# Patient Record
Sex: MALE | Race: WHITE | Employment: OTHER | URBAN - METROPOLITAN AREA
[De-identification: names, ages, dates, MRNs, and addresses within clinical notes are randomized per-mention and may not be internally consistent; named-entity substitution may affect disease eponyms.]

---

## 2017-02-01 ENCOUNTER — HOSPITAL ENCOUNTER (OUTPATIENT)
Dept: LAB | Age: 77
Discharge: HOME | End: 2017-02-01
Payer: COMMERCIAL

## 2017-02-01 ENCOUNTER — OFFICE VISIT (OUTPATIENT)
Dept: UROLOGY | Age: 77
End: 2017-02-01

## 2017-02-01 VITALS
HEART RATE: 67 BPM | WEIGHT: 220 LBS | TEMPERATURE: 98.3 F | OXYGEN SATURATION: 97 % | BODY MASS INDEX: 28.23 KG/M2 | DIASTOLIC BLOOD PRESSURE: 64 MMHG | SYSTOLIC BLOOD PRESSURE: 110 MMHG | HEIGHT: 74 IN

## 2017-02-01 DIAGNOSIS — C61 MALIGNANT NEOPLASM OF PROSTATE (HCC): Primary | ICD-10-CM

## 2017-02-01 DIAGNOSIS — C61 MALIGNANT NEOPLASM OF PROSTATE (HCC): ICD-10-CM

## 2017-02-01 LAB
BILIRUB UR QL STRIP: NEGATIVE
GLUCOSE UR-MCNC: NEGATIVE MG/DL
KETONES P FAST UR STRIP-MCNC: NEGATIVE MG/DL
PH UR STRIP: 6.5 [PH] (ref 4.6–8)
PROT UR QL STRIP: NORMAL MG/DL
PSA SERPL-MCNC: 2.1 NG/ML (ref 0–4)
SP GR UR STRIP: 1.03 (ref 1–1.03)
UA UROBILINOGEN AMB POC: NORMAL (ref 0.2–1)
URINALYSIS CLARITY POC: CLEAR
URINALYSIS COLOR POC: YELLOW
URINE BLOOD POC: NEGATIVE
URINE LEUKOCYTES POC: NEGATIVE
URINE NITRITES POC: NEGATIVE

## 2017-02-01 PROCEDURE — 84153 ASSAY OF PSA TOTAL: CPT | Performed by: UROLOGY

## 2017-02-01 NOTE — MR AVS SNAPSHOT
Visit Information Date & Time Provider Department Dept. Phone Encounter #  
 2/1/2017  9:00 AM Nolberto Short, [address] [id number] Your Appointments 3/6/2017  9:40 AM  
Follow Up with Josephine Musa MD  
[facility] ([facility]) Appt Note: 6 month follow up with EKG  
 [address] [place] [address] [address] [phone] [address] Upcoming Health Maintenance Date Due DTaP/Tdap/Td series (1 - Tdap) 7/27/1961 ZOSTER VACCINE AGE 60> 7/27/2000 GLAUCOMA SCREENING Q2Y 7/27/2005 Pneumococcal 65+ High/Highest Risk (1 of 2 - PCV13) 7/27/2005 MEDICARE YEARLY EXAM 7/27/2005 INFLUENZA AGE 9 TO ADULT 8/1/2016 Allergies as of 2/1/2017  Review Complete On: 2/1/2017 By: Jj Haley No Known Allergies Current Immunizations  Never Reviewed No immunizations on file. Not reviewed this visit You Were Diagnosed With   
  
 Codes Comments Malignant neoplasm of prostate ([facility] [address]    -  Primary ICD-10-CM: T28 ICD-9-CM: 701 Vitals BP Pulse Temp Height(growth percentile) Weight(growth percentile) SpO2  
 110/64 (BP 1 Location: Left arm, BP Patient Position: Sitting) 67 98.3 °F (36.8 °C) (Oral) 6' 2\" (1.88 m) 220 lb (99.8 kg) 97% BMI Smoking Status 28.25 kg/m2 Former Smoker Vitals History BMI and BSA Data Body Mass Index Body Surface Area  
 28.25 kg/m 2 2.28 m 2 Preferred Pharmacy Pharmacy Name Phone [phone] - [address], [address] AT [address] [phone] Your Updated Medication List  
  
   
This list is accurate as of: 2/1/17  9:23 AM.  Always use your most recent med list.  
  
  
  
  
 aspirin delayed-release 81 mg tablet Take  by mouth daily. COMBIGAN OP Instill  in eye. FISH OIL 1,000 mg Cap Generic drug:  omega-3 fatty acids-vitamin e Take 1 Cap by mouth. LUMIGAN OP Instill  in eye.  
  
 metoprolol succinate 25 mg XL tablet Commonly known as:  TOPROL-XL TK 1 T PO QD  
  
 multivitamin tablet Commonly known as:  ONE A DAY Take 1 Tab by mouth daily. SIMBRINZA 1-0.2 % Drps Generic drug:  brinzolamide-brimonidine Apply  to eye.  
  
 simvastatin 80 mg tablet Commonly known as:  ZOCOR Take 1 Tab by mouth nightly. therapeutic multivitamin tablet Commonly known as:  [facility] Take 1 Tab by Mouth Once a Day. We Performed the Following AMB POC URINALYSIS DIP STICK AUTO W/O MICRO [78030 CPT(R)] Patient Instructions Prostate Cancer: Care Instructions Your Care Instructions The prostate gland is a small, walnut-shaped organ that lies just below a man's bladder. It surrounds the urethra, the tube that carries urine from the bladder out of the body through the penis. Prostate cancer is the abnormal growth of cells in the prostate gland. Prostate cancer cells can spread within the prostate, to nearby lymph nodes and other tissues, and to other parts of the body. When the cancer hasn't spread outside the prostate, it is called localized prostate cancer. With localized prostate cancer, your options depend on how likely it is that your cancer will grow. Tests will show if your cancer is likely to grow. · Low-risk cancer isn't likely to grow right away. If your cancer is low-risk, you can choose active surveillance. This means your cancer will be watched closely by your doctor with regular checkups and tests to see if the cancer grows. This choice allows you to delay having surgery or radiation, often for many years. If the cancer grows very slowly, you may never need treatment. · Medium-risk cancer is more likely to grow.  Some men with this type of cancer may be able to choose active surveillance. Others may need to choose surgery or radiation. · High-risk cancer is most likely to grow. If you have high-risk cancer, you will likely need to choose surgery or radiation. If your cancer has already spread outside the prostate or to other parts of the body, then you may have other treatments, like chemotherapy or hormone therapy. If you are over age [de-identified] or have other serious health problems, like heart disease, you may choose not to have treatments to cure your cancer. Instead, you can just have treatments to manage your symptoms. This is called watchful waiting. Finding out that you have cancer is scary. You may feel many emotions and may need some help coping. Seek out family, friends, and counselors for support. You also can do things at home to make yourself feel better while you go through treatment. Call the [facility] (3-764.610.5156) or visit its website at [address] for more information. Follow-up care is a key part of your treatment and safety. Be sure to make and go to all appointments, and call your doctor if you are having problems. It's also a good idea to know your test results and keep a list of the medicines you take. How can you care for yourself at home? · Your doctor will talk to you about your treatment options. You may need to learn more about each of them before you can decide which treatment is best for you. · Take your medicines exactly as prescribed. Call your doctor if you think you are having a problem with your medicine. You will get more details on the specific medicines your doctor prescribes. · Eat healthy food. If you do not feel like eating, try to eat food that has protein and extra calories to keep up your strength and prevent weight loss. Drink liquid meal replacements for extra calories and protein. Try to eat your main meal early. · Take steps to control your stress and workload.  Learn relaxation techniques. ¨ Share your feelings. Stress and tension affect our emotions. By expressing your feelings to others, you may be able to understand and cope with them. ¨ Consider joining a support group. Talking about a problem with your spouse, a good friend, or other people with similar problems is a good way to reduce tension and stress. ¨ Express yourself through art. Try writing, crafts, dance, or art to relieve stress. Some dance, writing, or art groups may be available just for people who have cancer. ¨ Be kind to your body and mind. Getting enough sleep, eating a healthy diet, and taking time to do things you enjoy can contribute to an overall feeling of balance in your life and can help reduce stress. ¨ Get help if you need it. Discuss your concerns with your doctor or counselor. · Get some physical activity every day, but do not get too tired. Keep doing the hobbies you enjoy as your energy allows. · If you are vomiting or have diarrhea: ¨ Drink plenty of fluids (enough so that your urine is light yellow or clear like water) to prevent dehydration. Choose water and other caffeine-free clear liquids. If you have kidney, heart, or liver disease and have to limit fluids, talk with your doctor before you increase the amount of fluids you drink. ¨ When you are able to eat, try clear soups, mild foods, and liquids until all symptoms are gone for 12 to 48 hours. Jell-O, dry toast, crackers, and cooked cereal are also good choices. · If you have not already done so, prepare a list of advance directives. Advance directives are instructions to your doctor and family members about what kind of care you want if you become unable to speak or express yourself. When should you call for help? Call your doctor now or seek immediate medical care if: 
· You cannot urinate. · You have symptoms of a urinary infection. For example: ¨ You have blood or pus in your urine. ¨ You have pain in your back just below your rib cage. This is called flank pain. ¨ You have a fever, chills, or body aches. ¨ It hurts to urinate. ¨ You have groin or belly pain. · You have pain in your back or hips. · Your pain is not controlled. · You are vomiting or nauseated. Watch closely for changes in your health, and be sure to contact your doctor if: 
· You have pain when you ejaculate. · You have trouble starting or controlling your urine. Where can you learn more? Go to http://haley-travis.info/. Enter V141 in the search box to learn more about \"Prostate Cancer: Care Instructions. \" Current as of: July 26, 2016 Content Version: 11.1 © 1334-7228 [employer]. Care instructions adapted under license by [employer] (which disclaims liability or warranty for this information). If you have questions about a medical condition or this instruction, always ask your healthcare professional. [place] [address] any warranty or liability for your use of this information. Introducing [facility]! Dear Sheri Zhao: Thank you for requesting a Dragonfly account. Our records indicate that you already have an active Dragonfly account. You can access your account anytime at [email or web address] Did you know that you can access your hospital and ER discharge instructions at any time in Dragonfly? You can also review all of your test results from your hospital stay or ER visit. Additional Information If you have questions, please visit the Frequently Asked Questions section of the Dragonfly website at [email or web address] Remember, Dragonfly is NOT to be used for urgent needs. For medical emergencies, dial 911. Now available from your iPhone and Android! Please provide this summary of care documentation to your next provider. Your primary care clinician is listed as Asif Perez. If you have any questions after today's visit, please call [phone].

## 2017-02-01 NOTE — PROGRESS NOTES
Lilian Villavicencio 68 y.o. male     Mr. Jose G Wilkinson seen today for prostate carcinoma follow-up                             PSA 3.4/Itzel 6 histology/XRT 2005    Patient has had a low-level PSA elevation since 2010 in the 2-3 range with no irritative or obstructive voiding symptoms  Patient is currently asymptomatic        PSA 1.8 in February 2016  PSA 2.9 2010                                                        negative bone scan in 2011  PSA 2.2 in June 2012  PSA 2.8 2013  PSA 6.8 2014  PSA 3.8 in August 2014  PSA 2.3 in July 2015  PSA 2.5 in August 2016     Review of Systems:    CNS: Coronary artery disease  Respiratory: No wheezing or shortness of breath  Cardiovascular:Coronary artery disease/hypertension  Intestinal:No GERD or constipation  Urinary: Kidney stone disease  Skeletal: DJD  Endocrine:No diabetes or thyroid disease  Other:    Allergies: No Known Allergies   Medications:    Current Outpatient Prescriptions   Medication Sig Dispense Refill    brinzolamide-brimonidine (SIMBRINZA) 1-0.2 % drps Apply  to eye.  simvastatin (ZOCOR) 80 mg tablet Take 1 Tab by mouth nightly. 90 Tab 3    metoprolol succinate (TOPROL-XL) 25 mg XL tablet TK 1 T PO QD  3    BIMATOPROST (LUMIGAN OP) Instill  in eye.  therapeutic multivitamin (THERAGRAN) tablet Take 1 Tab by Mouth Once a Day.  multivitamin (ONE A DAY) tablet Take 1 Tab by mouth daily.  aspirin delayed-release 81 mg tablet Take  by mouth daily.  omega-3 fatty acids-vitamin e (FISH OIL) 1,000 mg Cap Take 1 Cap by mouth.  BRIMONIDINE TARTRATE/TIMOLOL (COMBIGAN OP) Instill  in eye.          Past Medical History   Diagnosis Date    Atherosclerotic coronary vascular disease     Calculus of kidney     Calculus of ureter     Family history of prostate cancer     Glaucoma     History of radiation therapy      external beam radiation for prostate cancer    Hypercholesterolemia     Hypertension     Malignant [facility] prostate (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 and other lower urinary tract symptoms (LUTS)       Past Surgical History   Procedure Laterality Date    Hx hernia repair       right inguinal herniorrhapy    Pr cardiac surg procedure unlist       coronary artery stent placement    Pr prostate biopsy, needle, saturation sampling       Family History   Problem Relation Age of Onset    Cancer Father      prostate    Cancer Other     Heart Disease Other     Diabetes Other         Physical Examination: Well-nourished mature male in no apparent distress    Prostate by MICHAEL is small and barely palpable-smooth nontender no nodularity no induration  No rectal masses induration or tenderness    Urinalysis: Negative dipstick/nitrite negative    Impression: Prostate carcinoma status post XRT 2005                       -Low-level PSA elevation ×8 years with negative bone scan and absence of symptoms-suspect PSA elevation is secondary to seminal vesicle origin, not recrudescence of prostate                        carcinoma      Plan: PSA today    rtc 1 yr      More than 1/2 of this 15 minute visit was spent in counselling and coordination of care, as described above. Ean Alvarado MD  -electronically signed-    PLEASE NOTE:  This document has been produced using voice recognition software. Unrecognized errors in transcription may be present.

## 2017-02-01 NOTE — PROGRESS NOTES
Mr. Amy Leyva has a reminder for a \"due or due soon\" health maintenance. I have asked that he contact his primary care provider for follow-up on this health maintenance.

## 2017-02-01 NOTE — PATIENT INSTRUCTIONS
Prostate Cancer: Care Instructions  Your Care Instructions    The prostate gland is a small, walnut-shaped organ that lies just below a man's bladder. It surrounds the urethra, the tube that carries urine from the bladder out of the body through the penis. Prostate cancer is the abnormal growth of cells in the prostate gland. Prostate cancer cells can spread within the prostate, to nearby lymph nodes and other tissues, and to other parts of the body. When the cancer hasn't spread outside the prostate, it is called localized prostate cancer. With localized prostate cancer, your options depend on how likely it is that your cancer will grow. Tests will show if your cancer is likely to grow. · Low-risk cancer isn't likely to grow right away. If your cancer is low-risk, you can choose active surveillance. This means your cancer will be watched closely by your doctor with regular checkups and tests to see if the cancer grows. This choice allows you to delay having surgery or radiation, often for many years. If the cancer grows very slowly, you may never need treatment. · Medium-risk cancer is more likely to grow. Some men with this type of cancer may be able to choose active surveillance. Others may need to choose surgery or radiation. · High-risk cancer is most likely to grow. If you have high-risk cancer, you will likely need to choose surgery or radiation. If your cancer has already spread outside the prostate or to other parts of the body, then you may have other treatments, like chemotherapy or hormone therapy. If you are over age [de-identified] or have other serious health problems, like heart disease, you may choose not to have treatments to cure your cancer. Instead, you can just have treatments to manage your symptoms. This is called watchful waiting. Finding out that you have cancer is scary. You may feel many emotions and may need some help coping. Seek out family, friends, and counselors for support.  You also can do things at home to make yourself feel better while you go through treatment. Call the [facility] (1-111.106.7461) or visit its website at [address] for more information. Follow-up care is a key part of your treatment and safety. Be sure to make and go to all appointments, and call your doctor if you are having problems. It's also a good idea to know your test results and keep a list of the medicines you take. How can you care for yourself at home? · Your doctor will talk to you about your treatment options. You may need to learn more about each of them before you can decide which treatment is best for you. · Take your medicines exactly as prescribed. Call your doctor if you think you are having a problem with your medicine. You will get more details on the specific medicines your doctor prescribes. · Eat healthy food. If you do not feel like eating, try to eat food that has protein and extra calories to keep up your strength and prevent weight loss. Drink liquid meal replacements for extra calories and protein. Try to eat your main meal early. · Take steps to control your stress and workload. Learn relaxation techniques. ¨ Share your feelings. Stress and tension affect our emotions. By expressing your feelings to others, you may be able to understand and cope with them. ¨ Consider joining a support group. Talking about a problem with your spouse, a good friend, or other people with similar problems is a good way to reduce tension and stress. ¨ Express yourself through art. Try writing, crafts, dance, or art to relieve stress. Some dance, writing, or art groups may be available just for people who have cancer. ¨ Be kind to your body and mind. Getting enough sleep, eating a healthy diet, and taking time to do things you enjoy can contribute to an overall feeling of balance in your life and can help reduce stress. ¨ Get help if you need it.  Discuss your concerns with your doctor or counselor. · Get some physical activity every day, but do not get too tired. Keep doing the hobbies you enjoy as your energy allows. · If you are vomiting or have diarrhea:  ¨ Drink plenty of fluids (enough so that your urine is light yellow or clear like water) to prevent dehydration. Choose water and other caffeine-free clear liquids. If you have kidney, heart, or liver disease and have to limit fluids, talk with your doctor before you increase the amount of fluids you drink. ¨ When you are able to eat, try clear soups, mild foods, and liquids until all symptoms are gone for 12 to 48 hours. Jell-O, dry toast, crackers, and cooked cereal are also good choices. · If you have not already done so, prepare a list of advance directives. Advance directives are instructions to your doctor and family members about what kind of care you want if you become unable to speak or express yourself. When should you call for help? Call your doctor now or seek immediate medical care if:  · You cannot urinate. · You have symptoms of a urinary infection. For example:  ¨ You have blood or pus in your urine. ¨ You have pain in your back just below your rib cage. This is called flank pain. ¨ You have a fever, chills, or body aches. ¨ It hurts to urinate. ¨ You have groin or belly pain. · You have pain in your back or hips. · Your pain is not controlled. · You are vomiting or nauseated. Watch closely for changes in your health, and be sure to contact your doctor if:  · You have pain when you ejaculate. · You have trouble starting or controlling your urine. Where can you learn more? Go to http://haley-travis.info/. Enter V141 in the search box to learn more about \"Prostate Cancer: Care Instructions. \"  Current as of: July 26, 2016  Content Version: 11.1  © 0425-1915 [employer].  Care instructions adapted under license by [employer] (which disclaims liability or warranty for this information). If you have questions about a medical condition or this instruction, always ask your healthcare professional. [place] [address] any warranty or liability for your use of this information.

## 2017-03-06 ENCOUNTER — OFFICE VISIT (OUTPATIENT)
Dept: CARDIOLOGY CLINIC | Age: 77
End: 2017-03-06

## 2017-03-06 VITALS
DIASTOLIC BLOOD PRESSURE: 80 MMHG | WEIGHT: 219 LBS | HEART RATE: 64 BPM | SYSTOLIC BLOOD PRESSURE: 122 MMHG | BODY MASS INDEX: 28.11 KG/M2 | OXYGEN SATURATION: 98 % | HEIGHT: 74 IN

## 2017-03-06 DIAGNOSIS — E78.00 HYPERCHOLESTEROLEMIA: ICD-10-CM

## 2017-03-06 DIAGNOSIS — I25.10 CORONARY ARTERY DISEASE INVOLVING NATIVE CORONARY ARTERY OF NATIVE HEART WITHOUT ANGINA PECTORIS: Primary | ICD-10-CM

## 2017-03-06 NOTE — PROGRESS NOTES
HISTORY OF PRESENT ILLNESS  Lennox Payton is a 68 y.o. male. HPI  He has been doing very well. He denies any cardiac symptoms. He has had no chest pain, dyspnea, orthopnea or PND. He denies palpitations, dizziness or syncope. He has had no symptoms to indicate claudication, TIA or amaurosis fugax. He continues to exercise regularly at the gym with no difficulties. His cholesterol profile has been very satisfactory. He is a nonsmoker. He has history of coronary artery disease and had stent placement in January of 2004 in [place] at [facility]. 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 He has history of hypertension and hypercholesterolemia. He has a family history of prostate cancer. He denies diabetes mellitus. Review of Systems   Constitutional: Negative for malaise/fatigue and weight loss. HENT: Negative for hearing loss. Eyes: Negative for blurred vision and double vision. Respiratory: Negative for shortness of breath. Cardiovascular: Negative for chest pain, palpitations, orthopnea, claudication, leg swelling and PND. Gastrointestinal: Negative for blood in stool, heartburn and melena. Genitourinary: Positive for frequency. Negative for dysuria, hematuria and urgency. Musculoskeletal: Negative for back pain and joint pain. Skin: Negative for itching and rash. Neurological: Negative for dizziness, loss of consciousness, weakness and headaches. Psychiatric/Behavioral: Negative for depression and memory loss. Physical Exam   Constitutional: He is oriented to person, place, and time. He appears well-developed and well-nourished. HENT:   Head: Normocephalic and atraumatic. Eyes: Conjunctivae are normal. Pupils are equal, round, and reactive to light. Neck: Normal range of motion. Neck supple. No JVD present.    Cardiovascular: Normal rate, regular rhythm, S1 normal and S2 normal.   No extrasystoles are present. PMI is not displaced. Exam reveals no gallop and no friction rub. No murmur heard. Pulses:       Carotid pulses are 3+ on the right side, and 3+ on the left side. Pulmonary/Chest: Effort normal. He has no rales. Abdominal: Soft. There is no tenderness. Musculoskeletal: He exhibits no edema. Neurological: He is alert and oriented to person, place, and time. No cranial nerve deficit. Skin: Skin is warm and dry. Psychiatric: He has a normal mood and affect. His behavior is normal.     Visit Vitals    /80    Pulse 64    Ht 6' 2\" (1.88 m)    Wt 99.3 kg (219 lb)    SpO2 98%    BMI 28.12 kg/m2       Past Medical History:   Diagnosis Date    Atherosclerotic coronary vascular disease     Calculus of kidney     Calculus of ureter     Family history of prostate cancer     Glaucoma     History of radiation therapy     external beam radiation for prostate cancer    Hypercholesterolemia     Hypertension     [facility]    [facility]     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      Social History Main Topics    Smoking status: Former Smoker     Years: 2.00     Quit date: 6/16/1961    Smokeless tobacco: Never Used    Alcohol use Yes      Comment: socially    Drug use: Not on file    Sexual activity: Not on file     Other Topics Concern    Not on file     Social History Narrative       Family History   Problem Relation Age of Onset    Cancer Father      prostate    Cancer Other     Heart Disease Other     Diabetes Other        Past Surgical History:   Procedure Laterality Date    CARDIAC SURG PROCEDURE UNLIST coronary artery stent placement    HX HERNIA REPAIR      right inguinal herniorrhapy    [place] PROSTATE BIOPSY, NEEDLE, SATURATION SAMPLING         Current Outpatient Prescriptions   Medication Sig Dispense Refill    brinzolamide-brimonidine (SIMBRINZA) 1-0.2 % drps Apply  to eye.  simvastatin (ZOCOR) 80 mg tablet Take 1 Tab by mouth nightly. 90 Tab 3    metoprolol succinate (TOPROL-XL) 25 mg XL tablet TK 1 T PO QD  3    BIMATOPROST (LUMIGAN OP) Instill  in eye.  therapeutic multivitamin (THERAGRAN) tablet Take 1 Tab by Mouth Once a Day.  multivitamin (ONE A DAY) tablet Take 1 Tab by mouth daily.  aspirin delayed-release 81 mg tablet Take  by mouth daily.  omega-3 fatty acids-vitamin e (FISH OIL) 1,000 mg Cap Take 1 Cap by mouth. EKG: unchanged from previous tracings, normal sinus rhythm, nonspecific ST and T waves changes, inferolateral scar  . ASSESSMENT and PLAN  Encounter Diagnoses   Name Primary?  History of coronary artery disease, s/p stent Jan. 2004,inferior scar Yes    Hypercholesterolemia    He continues to do well. He has been very active and has had no symptoms to indicate angina. His cholesterol profile is very satisfactory. We discussed a followup stress test, but the patient declined to have a stress test unless he becomes symptomatic, which I concurred with. For now, he will be continued on the current medical regimen.

## 2017-03-06 NOTE — PROGRESS NOTES
1. Have you been to the ER, urgent care clinic since your last visit? Hospitalized since your last visit? no  2. Have you seen or consulted any other health care providers outside of the [address] [place] [address] since your last visit? Include any pap smears or colon screening.  no

## 2017-07-07 RX ORDER — METOPROLOL SUCCINATE 25 MG/1
TABLET, EXTENDED RELEASE ORAL
Qty: 90 TAB | Refills: 3 | Status: SHIPPED | OUTPATIENT
Start: 2017-07-07 | End: 2018-06-27 | Stop reason: SDUPTHER

## 2017-09-13 ENCOUNTER — OFFICE VISIT (OUTPATIENT)
Dept: CARDIOLOGY CLINIC | Age: 77
End: 2017-09-13

## 2017-09-13 ENCOUNTER — TELEPHONE (OUTPATIENT)
Dept: CARDIOLOGY CLINIC | Age: 77
End: 2017-09-13

## 2017-09-13 VITALS
HEART RATE: 96 BPM | SYSTOLIC BLOOD PRESSURE: 110 MMHG | WEIGHT: 217 LBS | HEIGHT: 74 IN | DIASTOLIC BLOOD PRESSURE: 62 MMHG | OXYGEN SATURATION: 64 % | BODY MASS INDEX: 27.85 KG/M2

## 2017-09-13 DIAGNOSIS — E78.00 HYPERCHOLESTEREMIA: ICD-10-CM

## 2017-09-13 DIAGNOSIS — I25.10 CORONARY ARTERY DISEASE INVOLVING NATIVE CORONARY ARTERY OF NATIVE HEART WITHOUT ANGINA PECTORIS: Primary | ICD-10-CM

## 2017-09-13 NOTE — PROGRESS NOTES
HISTORY OF PRESENT ILLNESS  Cherylene Helper is a 68 y.o. male. HPI  He has been doing very well. He denies any cardiac symptoms. He has had no chest pain, dyspnea, orthopnea or PND. He has had no palpitations, dizziness or syncope. He denies any symptoms to indicate TIA or amaurosis fugax. His activity level appears to be way above average for his age. He is a nonsmoker. He has history of coronary artery disease and had stent placement in January of 2004 in [place] at [facility]. 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 He has history of hypertension and hypercholesterolemia. He has a family history of prostate cancer. He denies diabetes mellitus. Review of Systems   Constitutional: Negative for malaise/fatigue and weight loss. HENT: Negative for hearing loss. Eyes: Negative for blurred vision and double vision. Respiratory: Negative for shortness of breath. Cardiovascular: Negative for chest pain, palpitations, orthopnea, claudication, leg swelling and PND. Gastrointestinal: Negative for blood in stool, heartburn and melena. Genitourinary: Positive for frequency. Negative for dysuria, hematuria and urgency. Musculoskeletal: Negative for back pain and joint pain. Skin: Negative for itching and rash. Neurological: Negative for dizziness, loss of consciousness and weakness. Psychiatric/Behavioral: Negative for depression and memory loss. Physical Exam   Constitutional: He is oriented to person, place, and time. He appears well-developed and well-nourished. HENT:   Head: Normocephalic and atraumatic. Eyes: Conjunctivae are normal. Pupils are equal, round, and reactive to light. Neck: Normal range of motion. Neck supple. No JVD present. Cardiovascular: Normal rate, regular rhythm, S1 normal and S2 normal.   No extrasystoles are present.  PMI is not displaced. Exam reveals no gallop and no friction rub. No murmur heard. Pulses:       Carotid pulses are 3+ on the right side, and 3+ on the left side. Pulmonary/Chest: Effort normal. He has no rales. Abdominal: Soft. There is no tenderness. Musculoskeletal: He exhibits no edema. Neurological: He is alert and oriented to person, place, and time. No cranial nerve deficit. Skin: Skin is warm and dry. Psychiatric: He has a normal mood and affect. His behavior is normal.     Visit Vitals    /62    Pulse 96    Ht 6' 2\" (1.88 m)    Wt 98.4 kg (217 lb)    SpO2 (!) 64%    BMI 27.86 kg/m2       Past Medical History:   Diagnosis Date    Atherosclerotic coronary vascular disease     Calculus of kidney     Calculus of ureter     Family history of prostate cancer     Glaucoma     History of radiation therapy     external beam radiation for prostate cancer    Hypercholesterolemia     Hypertension     [facility] 6/14/2011    MI [facility]myocardial infarction) [facility]     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      Social History Main Topics    Smoking status: Former Smoker     Years: 2.00     Quit date: 6/16/1961    Smokeless tobacco: Never Used    Alcohol use Yes      Comment: socially    Drug use: Not on file    Sexual activity: Not on file     Other Topics Concern    Not on file     Social History Narrative       Family History   Problem Relation Age of Onset    Cancer Father      prostate    Cancer Other     Heart Disease Other     Diabetes Other        Past Surgical History:   Procedure Laterality Date    CARDIAC SURG PROCEDURE UNLIST      coronary artery stent placement    HX HERNIA REPAIR      right inguinal herniorrhapy    [place] PROSTATE BIOPSY, NEEDLE, SATURATION SAMPLING         Current Outpatient Prescriptions   Medication Sig Dispense Refill    metoprolol succinate (TOPROL-XL) 25 mg XL tablet TaKe 1 Tablet orally once a day 90 Tab 3    brinzolamide-brimonidine (SIMBRINZA) 1-0.2 % drps Apply  to eye.  simvastatin (ZOCOR) 80 mg tablet Take 1 Tab by mouth nightly. 90 Tab 3    BIMATOPROST (LUMIGAN OP) Instill  in eye.  therapeutic multivitamin (THERAGRAN) tablet Take 1 Tab by Mouth Once a Day.  multivitamin (ONE A DAY) tablet Take 1 Tab by mouth daily.  aspirin delayed-release 81 mg tablet Take  by mouth daily.  omega-3 fatty acids-vitamin e (FISH OIL) 1,000 mg Cap Take 1 Cap by mouth. EKG: unchanged from previous tracings, normal sinus rhythm, nonspecific ST and T waves changes, inf.scar  . ASSESSMENT and PLAN  Encounter Diagnoses   Name Primary?  History of coronary artery disease, s/p stent Jan. 2004  Yes    Hypercholesteremia    He continues to do well. He has had no symptoms to indicate angina or cardiac decompensation. His activity level is way above average for his age of [de-identified]. His blood pressure has been under control and his cholesterol profile has been satisfactory. I would repeat a followup cholesterol profile. I have not received his medical records from [facility] in [place] and I will make another request.  For now, he will be continued on the current medical regimen.

## 2017-09-13 NOTE — PROGRESS NOTES
1. Have you been to the ER, urgent care clinic since your last visit? Hospitalized since your last visit? no  2. Have you seen or consulted any other health care providers outside of the [address] [place] [address] since your last visit? Include any pap smears or colon screening.   no

## 2017-09-13 NOTE — TELEPHONE ENCOUNTER
Called Dr Shanna Quintero over at [place], [place] to obtain records for a cardiac cath and stent being placed in 2004. I have been told by a medical records person that medical records older than 10 years are purged, meaning they are destroyed. They state it is [facility] and [facility]. Called patient informed them about that. He will be bringing over the records he has in his house in regards to this cath.

## 2017-09-13 NOTE — MR AVS SNAPSHOT
Visit Information Date & Time Provider Department Dept. Phone Encounter #  
 9/13/2017  1:20 PM Marti Prater MD [facility] [id number] Your Appointments 2/1/2018  9:00 AM  
Office Visit with Jared Barclay MD  
[facility] [address]) Appt Note: PSA  
 [address] [address] [address]  
[phone] [address] [address]  
  
    
 3/12/2018  8:40 AM  
Follow Up with Marti Prater MD  
[facility] [address] ([address]) Appt Note: 6 month f/up [place] [address] [address] [address] [phone] [address] [place] [address] [address] Upcoming Health Maintenance Date Due DTaP/Tdap/Td series (1 - Tdap) 7/27/1961 ZOSTER VACCINE AGE 60> 5/27/2000 GLAUCOMA SCREENING Q2Y 7/27/2005 Pneumococcal 65+ High/Highest Risk (1 of 2 - PCV13) 7/27/2005 MEDICARE YEARLY EXAM 7/27/2005 INFLUENZA AGE 9 TO ADULT 8/1/2017 Allergies as of 9/13/2017  Review Complete On: 9/13/2017 By: Marti Prater MD  
 Not on File Current Immunizations  Never Reviewed No immunizations on file. Not reviewed this visit You Were Diagnosed With   
  
 Codes Comments Coronary artery disease involving native coronary artery of native heart without angina pectoris    -  Primary ICD-10-CM: I25.10 ICD-9-CM: 414.01 Hypercholesteremia     ICD-10-CM: E78.00 ICD-9-CM: 272.0 Vitals BP Pulse Height(growth percentile) Weight(growth percentile) SpO2 BMI  
 110/62 96 6' 2\" (1.88 m) 217 lb (98.4 kg) (!) 64% 27.86 kg/m2 Smoking Status Former Smoker Vitals History BMI and BSA Data Body Mass Index Body Surface Area  
 27.86 kg/m 2 2.27 m 2 Preferred Pharmacy Pharmacy Name Phone [phone] - [place], [address] [phone] Your Updated Medication List  
  
   
This list is accurate as of: 9/13/17  1:50 PM.  Always use your most recent med list.  
  
  
  
  
 aspirin delayed-release 81 mg tablet Take  by mouth daily. FISH OIL 1,000 mg Cap Generic drug:  omega-3 fatty acids-vitamin e Take 1 Cap by mouth. LUMIGAN OP Instill  in eye.  
  
 metoprolol succinate 25 mg XL tablet Commonly known as:  TOPROL-XL TaKe 1 Tablet orally once a day  
  
 multivitamin tablet Commonly known as:  ONE A DAY Take 1 Tab by mouth daily. SIMBRINZA 1-0.2 % Drps Generic drug:  brinzolamide-brimonidine Apply  to eye.  
  
 simvastatin 80 mg tablet Commonly known as:  ZOCOR Take 1 Tab by mouth nightly. therapeutic multivitamin tablet Commonly known as:  [facility] Take 1 Tab by Mouth Once a Day. We Performed the Following AMB POC EKG ROUTINE W/ 12 LEADS, INTER & REP [58924 CPT(R)] To-Do List   
 09/13/2017 Lab:  LIPID PANEL   
  
 03/16/2018 Lab:  HEPATIC FUNCTION PANEL Patient Instructions Verbal order and read back per Rut Arroyo MD 
 
 
 
  
Introducing [facility]! Dear Bartolome Calderon: Thank you for requesting a Trivnet account. Our records indicate that you already have an active Trivnet account. You can access your account anytime at [email or web address] Did you know that you can access your hospital and ER discharge instructions at any time in Trivnet? You can also review all of your test results from your hospital stay or ER visit. Additional Information If you have questions, please visit the Frequently Asked Questions section of the Trivnet website at [email or web address] Remember, Trivnet is NOT to be used for urgent needs. For medical emergencies, dial 911. Now available from your iPhone and Android! Please provide this summary of care documentation to your next provider. Your primary care clinician is listed as Trina Osgood. If you have any questions after today's visit, please call [phone].

## 2017-09-14 ENCOUNTER — HOSPITAL ENCOUNTER (OUTPATIENT)
Dept: LAB | Age: 77
Discharge: HOME | End: 2017-09-14
Payer: COMMERCIAL

## 2017-09-14 LAB
ALBUMIN SERPL-MCNC: 3.9 G/DL (ref 3.4–5)
ALBUMIN/GLOB SERPL: 1.5 {RATIO} (ref 0.8–1.7)
ALP SERPL-CCNC: 55 U/L (ref 45–117)
ALT SERPL-CCNC: 25 U/L (ref 16–61)
AST SERPL-CCNC: 20 U/L (ref 15–37)
BILIRUB DIRECT SERPL-MCNC: 0.2 MG/DL (ref 0–0.2)
BILIRUB SERPL-MCNC: 0.7 MG/DL (ref 0.2–1)
CHOLEST SERPL-MCNC: 124 MG/DL
GLOBULIN SER CALC-MCNC: 2.6 G/DL (ref 2–4)
HDLC SERPL-MCNC: 53 MG/DL (ref 40–60)
HDLC SERPL: 2.3 {RATIO} (ref 0–5)
LDLC SERPL CALC-MCNC: 58 MG/DL (ref 0–100)
LIPID PROFILE,FLP: NORMAL
PROT SERPL-MCNC: 6.5 G/DL (ref 6.4–8.2)
TRIGL SERPL-MCNC: 65 MG/DL (ref ?–150)
VLDLC SERPL CALC-MCNC: 13 MG/DL

## 2017-09-14 PROCEDURE — 80076 HEPATIC FUNCTION PANEL: CPT | Performed by: INTERNAL MEDICINE

## 2017-09-14 PROCEDURE — 36415 COLL VENOUS BLD VENIPUNCTURE: CPT | Performed by: INTERNAL MEDICINE

## 2017-09-14 PROCEDURE — 80061 LIPID PANEL: CPT | Performed by: INTERNAL MEDICINE

## 2017-09-18 ENCOUNTER — TELEPHONE (OUTPATIENT)
Dept: CARDIOLOGY CLINIC | Age: 77
End: 2017-09-18

## 2017-10-12 RX ORDER — SIMVASTATIN 80 MG/1
80 TABLET, FILM COATED ORAL
Qty: 90 TAB | Refills: 3 | Status: SHIPPED | OUTPATIENT
Start: 2017-10-12 | End: 2018-09-10 | Stop reason: SDUPTHER

## 2018-02-01 ENCOUNTER — OFFICE VISIT (OUTPATIENT)
Dept: UROLOGY | Age: 78
End: 2018-02-01

## 2018-02-01 ENCOUNTER — HOSPITAL ENCOUNTER (OUTPATIENT)
Dept: LAB | Age: 78
Discharge: HOME | End: 2018-02-01
Payer: COMMERCIAL

## 2018-02-01 VITALS
DIASTOLIC BLOOD PRESSURE: 63 MMHG | SYSTOLIC BLOOD PRESSURE: 110 MMHG | TEMPERATURE: 96.7 F | WEIGHT: 214 LBS | HEIGHT: 74 IN | HEART RATE: 76 BPM | OXYGEN SATURATION: 95 % | BODY MASS INDEX: 27.46 KG/M2

## 2018-02-01 DIAGNOSIS — C61 MALIGNANT NEOPLASM OF PROSTATE (HCC): Primary | ICD-10-CM

## 2018-02-01 DIAGNOSIS — C61 MALIGNANT NEOPLASM OF PROSTATE (HCC): ICD-10-CM

## 2018-02-01 LAB
BILIRUB UR QL STRIP: NEGATIVE
GLUCOSE UR-MCNC: NEGATIVE MG/DL
KETONES P FAST UR STRIP-MCNC: NEGATIVE MG/DL
PH UR STRIP: 5.5 [PH] (ref 4.6–8)
PROT UR QL STRIP: NEGATIVE
PSA SERPL-MCNC: 2.3 NG/ML (ref 0–4)
SP GR UR STRIP: 1.03 (ref 1–1.03)
UA UROBILINOGEN AMB POC: NORMAL (ref 0.2–1)
URINALYSIS CLARITY POC: CLEAR
URINALYSIS COLOR POC: YELLOW
URINE BLOOD POC: NORMAL
URINE LEUKOCYTES POC: NEGATIVE
URINE NITRITES POC: NEGATIVE

## 2018-02-01 PROCEDURE — 84153 ASSAY OF PSA TOTAL: CPT | Performed by: UROLOGY

## 2018-02-01 RX ORDER — TIMOLOL MALEATE 5 MG/ML
SOLUTION OPHTHALMIC
Refills: 0 | COMMUNITY
Start: 2018-01-06

## 2018-02-01 NOTE — PATIENT INSTRUCTIONS
Prostate Cancer Screening: Care Instructions  Your Care Instructions    The prostate gland is an organ found just below a man's bladder. It is the size and shape of a walnut. It surrounds the tube that carries urine from the bladder out of the body through the penis. This tube is called the urethra. Prostate cancer is the abnormal growth of cells in the prostate. It is the second most common type of cancer in men. (Skin cancer is the most common.)  Most cases of prostate cancer occur in men older than 72. The disease runs in families. And it's more common in -American men. When it's found and treated early, prostate cancer may be cured. But it is not always treated. This is because prostate cancer may not shorten your life, especially if you are older and the cancer is growing slowly. Follow-up care is a key part of your treatment and safety. Be sure to make and go to all appointments, and call your doctor if you are having problems. It's also a good idea to know your test results and keep a list of the medicines you take. What are the screening tests for prostate cancer? The main screening test for prostate cancer is the prostate-specific antigen (PSA) test. This is a blood test that measures how much PSA is in your blood. A high level may mean that you have an enlargement, an infection, or cancer. Along with the PSA test, you may have a digital rectal exam. The digital (finger) rectal exam checks for anything abnormal in your prostate. To do the exam, the doctor puts a lubricated, gloved finger into your rectum. If these tests suggest cancer, you may need a prostate biopsy. How is prostate cancer diagnosed? In a biopsy, the doctor takes small tissue samples from your prostate gland. Another doctor then looks at the tissue under a microscope to see if there are cancer cells, signs of infection, or other problems. The results help diagnose prostate cancer.   What are the pros and cons of screening? Neither a PSA test nor a digital rectal exam can tell you for sure that you do or do not have cancer. But they can help you decide if you need more tests, such as a prostate biopsy. Screening tests may be useful because most men with prostate cancer don't have symptoms. It can be hard to know if you have cancer until it is more advanced. And then it's harder to treat. But having a PSA test can also cause harm. The test may show high levels of PSA that aren't caused by cancer. So you could have a prostate biopsy you didn't need. Or the PSA test might be normal when there is cancer, so a cancer might not be found early. The test can also find cancers that would never have caused a problem during your lifetime. So you might have treatment that was not needed. Prostate cancer usually develops late in life and grows slowly. For many men, it does not shorten their lives. Some experts advise screening only for men who are at high risk. Talk with your doctor to see if screening is right for you. Where can you learn more? Go to http://haley-travis.info/. Enter R550 in the search box to learn more about \"Prostate Cancer Screening: Care Instructions. \"  Current as of: May 12, 2017  Content Version: 11.4  © 3028-8129 Healthwise, "[employer]". Care instructions adapted under license by [employer] (which disclaims liability or warranty for this information). If you have questions about a medical condition or this instruction, always ask your healthcare professional. [facility] any warranty or liability for your use of this information.

## 2018-02-01 NOTE — PROGRESS NOTES
Mr. [facility] has a reminder for a \"due or due soon\" health maintenance. I have asked that he contact his primary care provider for follow-up on this health maintenance. RBV Per Dr. Elliot Marinelli draw lab today for PSA for Prostate CA.

## 2018-02-01 NOTE — MR AVS SNAPSHOT
[address] [address] [place] [address] 
[phone] Patient: Erica Benito MRN: [id number]:[id number] Visit Information Date & Time Provider Department Dept. Phone Encounter #  
 2/1/2018  9:00 [address] [facility] [phone] Your Appointments 3/12/2018  8:40 AM  
Follow Up with Mari Ruffin MD  
[facility] ([facility]) Appt Note: 6 month f/up Irina Pruett [place] [address] [phone] [address] Upcoming Health Maintenance Date Due DTaP/Tdap/Td series (1 - Tdap) 7/27/1961 ZOSTER VACCINE AGE 60> 5/27/2000 GLAUCOMA SCREENING Q2Y 7/27/2005 Pneumococcal 65+ High/Highest Risk (1 of 2 - PCV13) 7/27/2005 MEDICARE YEARLY EXAM 7/27/2005 Influenza Age 5 to Adult 8/1/2017 Allergies as of 2/1/2018  Review Complete On: 2/1/2018 By: Amanda Navarro MD  
 No Known Allergies Current Immunizations  Never Reviewed No immunizations on file. Not reviewed this visit You Were Diagnosed With   
  
 Codes Comments [facility]    -  Primary ICD-10-CM: G93 ICD-9-CM: 625 Vitals BP Pulse Temp Height(growth percentile) Weight(growth percentile) SpO2  
 110/63 (BP 1 Location: Left arm, BP Patient Position: Sitting) 76 96.7 °F (35.9 °C) 6' 2\" (1.88 m) 214 lb (97.1 kg) 95% BMI Smoking Status 27.48 kg/m2 Former Smoker Vitals History BMI and BSA Data Body Mass Index Body Surface Area  
 27.48 kg/m 2 2.25 m 2 Preferred Pharmacy Pharmacy Name Phone [address] [phone] - [address], [address] [facility] [address] AT [address] [phone] Your Updated Medication List  
  
   
 This list is accurate as of: 2/1/18  9:20 AM.  Always use your most recent med list.  
  
  
  
  
 aspirin delayed-release 81 mg tablet Take  by mouth daily. FISH OIL 1,000 mg Cap Generic drug:  omega-3 fatty acids-vitamin e Take 1 Cap by mouth. LUMIGAN OP Instill  in eye.  
  
 metoprolol succinate 25 mg XL tablet Commonly known as:  TOPROL-XL TaKe 1 Tablet orally once a day  
  
 multivitamin tablet Commonly known as:  ONE A DAY Take 1 Tab by mouth daily. SIMBRINZA 1-0.2 % Drps Generic drug:  brinzolamide-brimonidine Apply  to eye.  
  
 simvastatin 80 mg tablet Commonly known as:  ZOCOR Take 1 Tab by mouth nightly. therapeutic multivitamin tablet Commonly known as:  [facility] Take 1 Tab by Mouth Once a Day. timolol 0.5 % ophthalmic gel-forming Commonly known as:  TIMOPTIC-XE INSTILL 1 DROP IN BOTH EYES DAILY We Performed the Following AMB POC URINALYSIS DIP STICK AUTO W/O MICRO [55354 CPT(R)] COLLECTION VENOUS BLOOD,VENIPUNCTURE [id number] CPT(R)] To-Do List   
 02/01/2018 Lab:  PSA, DIAGNOSTIC (PROSTATE SPECIFIC AG) Patient Instructions Prostate Cancer Screening: Care Instructions Your Care Instructions The prostate gland is an organ found just below a man's bladder. It is the size and shape of a walnut. It surrounds the tube that carries urine from the bladder out of the body through the penis. This tube is called the urethra. Prostate cancer is the abnormal growth of cells in the prostate. It is the second most common type of cancer in men. (Skin cancer is the most common.) Most cases of prostate cancer occur in men older than 72. The disease runs in families. And it's more common in -American men. When it's found and treated early, prostate cancer may be cured. But it is not always treated.  This is because prostate cancer may not shorten your life, especially if you are older and the cancer is growing slowly. Follow-up care is a key part of your treatment and safety. Be sure to make and go to all appointments, and call your doctor if you are having problems. It's also a good idea to know your test results and keep a list of the medicines you take. What are the screening tests for prostate cancer? The main screening test for prostate cancer is the prostate-specific antigen (PSA) test. This is a blood test that measures how much PSA is in your blood. A high level may mean that you have an enlargement, an infection, or cancer. Along with the PSA test, you may have a digital rectal exam. The digital (finger) rectal exam checks for anything abnormal in your prostate. To do the exam, the doctor puts a lubricated, gloved finger into your rectum. If these tests suggest cancer, you may need a prostate biopsy. How is prostate cancer diagnosed? In a biopsy, the doctor takes small tissue samples from your prostate gland. Another doctor then looks at the tissue under a microscope to see if there are cancer cells, signs of infection, or other problems. The results help diagnose prostate cancer. What are the pros and cons of screening? Neither a PSA test nor a digital rectal exam can tell you for sure that you do or do not have cancer. But they can help you decide if you need more tests, such as a prostate biopsy. Screening tests may be useful because most men with prostate cancer don't have symptoms. It can be hard to know if you have cancer until it is more advanced. And then it's harder to treat. But having a PSA test can also cause harm. The test may show high levels of PSA that aren't caused by cancer. So you could have a prostate biopsy you didn't need. Or the PSA test might be normal when there is cancer, so a cancer might not be found early. The test can also find cancers that would never have caused a problem during your lifetime.  So you might have treatment that was not needed. Prostate cancer usually develops late in life and grows slowly. For many men, it does not shorten their lives. Some experts advise screening only for men who are at high risk. Talk with your doctor to see if screening is right for you. Where can you learn more? Go to http://haley-travis.info/. Enter R550 in the search box to learn more about \"Prostate Cancer Screening: Care Instructions. \" Current as of: May 12, 2017 Content Version: 11.4 © 0286-6822 [employer]. Care instructions adapted under license by [employer] (which disclaims liability or warranty for this information). If you have questions about a medical condition or this instruction, always ask your healthcare professional. [address] any warranty or liability for your use of this information. Introducing [facility]! Dear Rocio Baer: Thank you for requesting a [employer] account. Our records indicate that you already have an active [employer] account. You can access your account anytime at [email or web address] Did you know that you can access your hospital and ER discharge instructions at any time in [employer]? You can also review all of your test results from your hospital stay or ER visit. Additional Information If you have questions, please visit the Frequently Asked Questions section of the [employer] website at [email or web address] Remember, [employer] is NOT to be used for urgent needs. For medical emergencies, dial 911. Now available from your iPhone and Android! Please provide this summary of care documentation to your next provider. Your primary care clinician is listed as Paige [address]. If you have any questions after today's visit, please call [phone].

## 2018-02-01 NOTE — PROGRESS NOTES
Megha Guidry 68 y.o. male    prostate carcinoma Itzel 6 histology/PSA 3.4/XRT 2005    . Veronika Booth seen today for prostate carcinoma follow-up  Patient is voiding well with a forceful stream good control nocturia once or twice per night-no complaints regarding urination at this time      Patient has had low-level undulating PSA elevation but the past 8 years    PSA 1.8 in February 2016  PSA 2.9 2010                                                        negative bone scan in 2011  PSA 2.2 in June 2012  PSA 2.8 2013  PSA 6.8 2014  PSA 3.8 in August 2014  PSA 2.3 in July 2015  PSA 2.5 in August 2016  PSA 2.1 in February 2017      Review of Systems:    CNS: Coronary artery disease  Respiratory: No wheezing or shortness of breath  Cardiovascular:Coronary artery disease/hypertension  Intestinal:No GERD or constipation  Urinary: Kidney stone disease  Skeletal: DJD  Endocrine:No diabetes or thyroid disease  Other:  Other:    Allergies: No Known Allergies   Medications:    Current Outpatient Prescriptions   Medication Sig Dispense Refill    timolol (TIMOPTIC-XE) 0.5 % ophthalmic gel-forming INSTILL 1 DROP IN BOTH EYES DAILY  0    simvastatin (ZOCOR) 80 mg tablet Take 1 Tab by mouth nightly. 90 Tab 3    metoprolol succinate (TOPROL-XL) 25 mg XL tablet TaKe 1 Tablet orally once a day 90 Tab 3    brinzolamide-brimonidine (SIMBRINZA) 1-0.2 % drps Apply  to eye.  BIMATOPROST (LUMIGAN OP) Instill  in eye.  multivitamin (ONE A DAY) tablet Take 1 Tab by mouth daily.  aspirin delayed-release 81 mg tablet Take  by mouth daily.  omega-3 fatty acids-vitamin e (FISH OIL) 1,000 mg Cap Take 1 Cap by mouth.  therapeutic multivitamin (THERAGRAN) tablet Take 1 Tab by Mouth Once a Day.          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 and other lower urinary tract symptoms (LUTS)       Past Surgical History:   Procedure Laterality Date    CARDIAC SURG PROCEDURE UNLIST      coronary artery stent placement    HX HERNIA REPAIR      right inguinal herniorrhapy    [place] PROSTATE BIOPSY, NEEDLE, SATURATION SAMPLING       Family History   Problem Relation Age of Onset    Cancer Father      prostate    Cancer Other     Heart Disease Other     Diabetes Other         Physical Examination: Well-nourished mature male in no apparent distress    Prostate by MICHAEL is smooth flat leathery firm nontender no nodularity  No rectal masses induration or tenderness    Urinalysis: Negative dipstick/nitrite negative/heme-negative  Prostate carcinoma    Impression:  prostate carcinoma, stable status post XRT 2005      Plan: PSA today    Discussed long-term follow-up of treated prostate carcinoma    rtc 1 yr PSA MICHAEL      More than 1/2 of this 15 minute visit was spent in counselling and coordination of care, as described above. Tawny Glynn MD  -electronically signed-    PLEASE NOTE:  This document has been produced using voice recognition software. Unrecognized errors in transcription may be present.

## 2018-03-12 ENCOUNTER — OFFICE VISIT (OUTPATIENT)
Dept: CARDIOLOGY CLINIC | Age: 78
End: 2018-03-12

## 2018-03-12 VITALS
BODY MASS INDEX: 27.98 KG/M2 | HEART RATE: 58 BPM | HEIGHT: 74 IN | OXYGEN SATURATION: 94 % | DIASTOLIC BLOOD PRESSURE: 84 MMHG | SYSTOLIC BLOOD PRESSURE: 132 MMHG | WEIGHT: 218 LBS

## 2018-03-12 DIAGNOSIS — E78.00 HYPERCHOLESTEREMIA: ICD-10-CM

## 2018-03-12 DIAGNOSIS — I25.10 CORONARY ARTERY DISEASE INVOLVING NATIVE CORONARY ARTERY OF NATIVE HEART WITHOUT ANGINA PECTORIS: Primary | ICD-10-CM

## 2018-03-12 RX ORDER — FLUTICASONE PROPIONATE 50 MCG
2 SPRAY, SUSPENSION (ML) NASAL DAILY
COMMUNITY

## 2018-03-12 NOTE — PROGRESS NOTES
HISTORY OF PRESENT ILLNESS  Deisy Al is a 68 y.o. male. HPI  He has been doing very well. He denies any cardiac symptoms. He has been active, exercising five times per week. He has had no chest pain, dyspnea, orthopnea or PND. He has had no palpitations, dizziness or syncope. He has had no symptoms to indicate claudication, TIA or amaurosis fugax. His cholesterol profile has been very satisfactory. His blood pressure has been under control. He is a nonsmoker. He has history of coronary artery disease and had stent placement in January of 2004 in [place] at [facility]. 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 He has history of hypertension and hypercholesterolemia. He has a family history of prostate cancer. He denies diabetes mellitus. Review of Systems   Constitutional: Negative for malaise/fatigue and weight loss. HENT: Negative for hearing loss. Eyes: Negative for blurred vision and double vision. Respiratory: Negative for shortness of breath. Cardiovascular: Negative for chest pain, palpitations, orthopnea, claudication, leg swelling and PND. Gastrointestinal: Negative for blood in stool, heartburn and melena. Genitourinary: Positive for frequency. Negative for dysuria, hematuria and urgency. Musculoskeletal: Negative for back pain and joint pain. Skin: Negative for itching and rash. Neurological: Negative for dizziness, loss of consciousness and weakness. Psychiatric/Behavioral: Negative for depression and memory loss. Physical Exam   Constitutional: He is oriented to person, place, and time. He appears well-developed and well-nourished. HENT:   Head: Normocephalic and atraumatic. Eyes: Conjunctivae are normal. Pupils are equal, round, and reactive to light. Neck: Normal range of motion. Neck supple. No JVD present.    Cardiovascular: Normal rate, regular rhythm, S1 normal and S2 normal.   No extrasystoles are present. PMI is not displaced. Exam reveals no gallop and no friction rub. No murmur heard. Pulses:       Carotid pulses are 3+ on the right side, and 3+ on the left side. Pulmonary/Chest: Effort normal. He has no wheezes. He has no rales. Abdominal: Soft. There is no tenderness. Musculoskeletal: He exhibits no edema. Neurological: He is alert and oriented to person, place, and time. No cranial nerve deficit. Skin: Skin is warm and dry. Psychiatric: He has a normal mood and affect. His behavior is normal.     Visit Vitals    /84    Pulse (!) 58    Ht 6' 2\" (1.88 m)    Wt 98.9 kg (218 lb)    SpO2 94%    BMI 27.99 kg/m2       Past Medical History:   Diagnosis Date    Atherosclerotic coronary vascular disease     Calculus of kidney     Calculus of ureter     Family history of prostate cancer     Glaucoma     History of radiation therapy     external beam radiation for prostate cancer    Hypercholesterolemia     Hypertension     Malignant ne[facility] of prostate ([facility] 6/14/2011    MI (myocardial infarction)     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      Social History Main Topics    Smoking status: Former Smoker     Years: 2.00     Quit date: 6/16/1961    Smokeless tobacco: Never Used    Alcohol use Yes      Comment: socially    Drug use: Not on file    Sexual activity: Not on file     Other Topics Concern    Not on file     Social History Narrative       Family History   Problem Relation Age of Onset    Cancer Father      prostate    Cancer Other     Heart Disease Other     Diabetes Other        Past Surgical History:   Procedure Laterality Date    CARDIAC SURG PROCEDURE UNLIST      coronary artery stent placement    HX HERNIA REPAIR      right inguinal herniorrhapy    [place] PROSTATE BIOPSY, NEEDLE, SATURATION SAMPLING         Current Outpatient Prescriptions   Medication Sig Dispense Refill    fluticasone (FLONASE ALLERGY RELIEF) 50 mcg/actuation nasal spray 2 Sprays by Both Nostrils route daily.  timolol (TIMOPTIC-XE) 0.5 % ophthalmic gel-forming INSTILL 1 DROP IN BOTH EYES DAILY  0    simvastatin (ZOCOR) 80 mg tablet Take 1 Tab by mouth nightly. 90 Tab 3    metoprolol succinate (TOPROL-XL) 25 mg XL tablet TaKe 1 Tablet orally once a day 90 Tab 3    brinzolamide-brimonidine (SIMBRINZA) 1-0.2 % drps Apply  to eye.  BIMATOPROST (LUMIGAN OP) Instill  in eye.  therapeutic multivitamin (THERAGRAN) tablet Take 1 Tab by Mouth Once a Day.  multivitamin (ONE A DAY) tablet Take 1 Tab by mouth daily.  aspirin delayed-release 81 mg tablet Take  by mouth daily.  omega-3 fatty acids-vitamin e (FISH OIL) 1,000 mg Cap Take 1 Cap by mouth. EKG: unchanged from previous tracings, normal sinus rhythm, nonspecific ST and T waves changes, inf.scar  . ASSESSMENT and PLAN  Encounter Diagnoses   Name Primary?  History of coronary artery disease, s/p stent Jan. 2004  Yes    Hypercholesteremia    He has been doing very well. He has had no symptoms to indicate angina or cardiac decompensation. His cholesterol profile has been satisfactory and his blood pressure has been under control. He has been very active exercising regularly five times per week. We discussed a follow-up stress nuclear cardiac imaging however the patient would like to not have a stress test at this time. For now, he will be continued on current medical regimen.

## 2018-03-12 NOTE — PROGRESS NOTES
1. Have you been to the ER, urgent care clinic since your last visit? Hospitalized since your last visit? No    2. Have you seen or consulted any other health care providers outside of the [address] [place] [address] since your last visit? Include any pap smears or colon screening.  No

## 2018-03-12 NOTE — MR AVS SNAPSHOT
[address] [address] [address] [address] [phone] Patient: Megha Guidry MRN: [id number] GPJ:[birth year] Visit Information Date & Time Provider Department Dept. Phone Encounter #  
 3/12/2018  8:40 AM Armando Javed MD [facility] [place] [phone] [id number] Your Appointments 2/4/2019  9:00 AM  
Office Visit with Gilmar Martinez MD  
[facility] [address] [place] [address]) Appt Note: PSA  
 [address] Matheus A [address] [address]  
[phone] [address] [facility] [address] Upcoming Health Maintenance Date Due DTaP/Tdap/Td series (1 - Tdap) 7/27/1961 ZOSTER VACCINE AGE 60> 5/27/2000 GLAUCOMA SCREENING Q2Y 7/27/2005 Bone Densitometry (Dexa) Screening 7/27/2005 Pneumococcal 65+ High/Highest Risk (1 of 2 - PCV13) 7/27/2005 MEDICARE YEARLY EXAM 7/27/2005 Influenza Age 5 to Adult 8/1/2017 Allergies as of 3/12/2018  Review Complete On: 3/12/2018 By: Armando Javed MD  
 Not on File Current Immunizations  Never Reviewed No immunizations on file. Not reviewed this visit You Were Diagnosed With   
  
 Codes Comments Coronary artery disease involving native coronary artery of native heart without angina pectoris    -  Primary ICD-10-CM: I25.10 ICD-9-CM: 414.01 Hypercholesteremia     ICD-10-CM: E78.00 ICD-9-CM: 272.0 Vitals BP Pulse Height(growth percentile) Weight(growth percentile) SpO2 BMI  
 132/84 (!) 58 6' 2\" (1.88 m) 218 lb (98.9 kg) 94% 27.99 kg/m2 Smoking Status Former Smoker BMI and BSA Data Body Mass Index Body Surface Area  
 27.99 kg/m 2 2.27 m 2 Preferred Pharmacy Pharmacy Name Phone [phone] - [address], [address] [facility] [address] [phone] Your Updated Medication List  
 This list is accurate as of 3/12/18  9:20 AM.  Always use your most recent med list.  
  
  
  
  
 aspirin delayed-release 81 mg tablet Take  by mouth daily. FISH OIL 1,000 mg Cap Generic drug:  omega-3 fatty acids-vitamin e Take 1 Cap by mouth. FLONASE ALLERGY RELIEF 50 mcg/actuation nasal spray Generic drug:  fluticasone 2 Sprays by Both Nostrils route daily. LUMIGAN OP Instill  in eye.  
  
 metoprolol succinate 25 mg XL tablet Commonly known as:  TOPROL-XL TaKe 1 Tablet orally once a day  
  
 multivitamin tablet Commonly known as:  ONE A DAY Take 1 Tab by mouth daily. SIMBRINZA 1-0.2 % Drps Generic drug:  brinzolamide-brimonidine Apply  to eye.  
  
 simvastatin 80 mg tablet Commonly known as:  ZOCOR Take 1 Tab by mouth nightly. therapeutic multivitamin tablet Commonly known as:  [facility] Take 1 Tab by Mouth Once a Day. timolol 0.5 % ophthalmic gel-forming Commonly known as:  TIMOPTIC-XE INSTILL 1 DROP IN BOTH EYES DAILY We Performed the Following AMB POC EKG ROUTINE W/ 12 LEADS, INTER & REP [33810 CPT(R)] To-Do List   
 03/12/2018 Lab:  HEPATIC FUNCTION PANEL   
  
 03/12/2018 Lab:  LIPID PANEL Introducing [facility]! Dear Bryant Young: Thank you for requesting a [employer] account. Our records indicate that you already have an active [employer] account. You can access your account anytime at [email or web address] Did you know that you can access your hospital and ER discharge instructions at any time in [employer]? You can also review all of your test results from your hospital stay or ER visit. Additional Information If you have questions, please visit the Frequently Asked Questions section of the [employer] website at [email or web address] Remember, [employer] is NOT to be used for urgent needs. For medical emergencies, dial 911. Now available from your iPhone and Android! Please provide this summary of care documentation to your next provider. Your primary care clinician is listed as Yary Aguilar. If you have any questions after today's visit, please call [phone].

## 2018-06-27 RX ORDER — METOPROLOL SUCCINATE 25 MG/1
TABLET, EXTENDED RELEASE ORAL
Qty: 90 TAB | Refills: 3 | Status: SHIPPED | OUTPATIENT
Start: 2018-06-27 | End: 2019-04-09 | Stop reason: SDUPTHER

## 2018-09-10 RX ORDER — SIMVASTATIN 80 MG/1
80 TABLET, FILM COATED ORAL
Qty: 90 TAB | Refills: 3 | Status: SHIPPED | OUTPATIENT
Start: 2018-09-10 | End: 2019-10-21 | Stop reason: SDUPTHER

## 2018-10-08 ENCOUNTER — HOSPITAL ENCOUNTER (OUTPATIENT)
Dept: LAB | Age: 78
Discharge: HOME | End: 2018-10-08
Payer: COMMERCIAL

## 2018-10-08 LAB
ALBUMIN SERPL-MCNC: 3.7 G/DL (ref 3.4–5)
ALBUMIN/GLOB SERPL: 1.4 {RATIO} (ref 0.8–1.7)
ALP SERPL-CCNC: 58 U/L (ref 45–117)
ALT SERPL-CCNC: 24 U/L (ref 16–61)
AST SERPL-CCNC: 17 U/L (ref 15–37)
BILIRUB DIRECT SERPL-MCNC: 0.2 MG/DL (ref 0–0.2)
BILIRUB SERPL-MCNC: 0.5 MG/DL (ref 0.2–1)
CHOLEST SERPL-MCNC: 115 MG/DL
GLOBULIN SER CALC-MCNC: 2.7 G/DL (ref 2–4)
HDLC SERPL-MCNC: 46 MG/DL (ref 40–60)
HDLC SERPL: 2.5 {RATIO} (ref 0–5)
LDLC SERPL CALC-MCNC: 52.6 MG/DL (ref 0–100)
LIPID PROFILE,FLP: NORMAL
PROT SERPL-MCNC: 6.4 G/DL (ref 6.4–8.2)
TRIGL SERPL-MCNC: 82 MG/DL (ref ?–150)
VLDLC SERPL CALC-MCNC: 16.4 MG/DL

## 2018-10-08 PROCEDURE — 36415 COLL VENOUS BLD VENIPUNCTURE: CPT | Performed by: INTERNAL MEDICINE

## 2018-10-08 PROCEDURE — 80061 LIPID PANEL: CPT | Performed by: INTERNAL MEDICINE

## 2018-10-08 PROCEDURE — 80076 HEPATIC FUNCTION PANEL: CPT | Performed by: INTERNAL MEDICINE

## 2018-10-09 ENCOUNTER — OFFICE VISIT (OUTPATIENT)
Dept: CARDIOLOGY CLINIC | Age: 78
End: 2018-10-09

## 2018-10-09 VITALS
OXYGEN SATURATION: 98 % | HEIGHT: 74 IN | BODY MASS INDEX: 27.59 KG/M2 | WEIGHT: 215 LBS | HEART RATE: 56 BPM | SYSTOLIC BLOOD PRESSURE: 130 MMHG | DIASTOLIC BLOOD PRESSURE: 70 MMHG

## 2018-10-09 DIAGNOSIS — I25.10 CORONARY ARTERY DISEASE INVOLVING NATIVE CORONARY ARTERY OF NATIVE HEART WITHOUT ANGINA PECTORIS: Primary | ICD-10-CM

## 2018-10-09 DIAGNOSIS — E78.00 HYPERCHOLESTEREMIA: ICD-10-CM

## 2018-10-09 NOTE — LETTER
10/9/2018 9:44 AM 
 
Mr. uHong Pollard [address] [facility] [address] [address] [address] [address]  40 Huong Pollard was seen in our office on 2018 for cardiac evaluation. From a cardiac standpoint he is cleared for eye surgery. Please feel free to contact our office if you have any questions regarding this patient. Sincerely, Maurisio Owsald MD

## 2018-10-09 NOTE — MR AVS SNAPSHOT
[address] [address] [address] [address] [phone] Patient: Patricia Shirley MRN: [id number] LWS:[birth year] Visit Information Date & Time Provider Department Dept. Phone Encounter #  
 10/9/2018  9:00 AM Judie Mckeon MD [facility] [address] [id number] Your Appointments 2/4/2019  9:00 AM  
Office Visit with Orly Mesa MD  
[facility] [address] [facility]) Appt Note: PSA  
 [address] [place] A [address] [address]  
[phone] [address] [place] [address] Upcoming Health Maintenance Date Due DTaP/Tdap/Td series (1 - Tdap) 7/27/1961 Shingrix Vaccine Age 50> (1 of 2) 7/27/1990 GLAUCOMA SCREENING Q2Y 7/27/2005 Pneumococcal 65+ High/Highest Risk (1 of 2 - PCV13) 7/27/2005 Influenza Age 5 to Adult 8/1/2018 MEDICARE YEARLY EXAM 10/8/2018 Allergies as of 10/9/2018  Review Complete On: 3/12/2018 By: Judie Mckeon MD  
 Not on File Current Immunizations  Never Reviewed No immunizations on file. Not reviewed this visit You Were Diagnosed With   
  
 Codes Comments Coronary artery disease involving native coronary artery of native heart without angina pectoris    -  Primary ICD-10-CM: I25.10 ICD-9-CM: 414.01 Hypercholesteremia     ICD-10-CM: E78.00 ICD-9-CM: 272.0 Vitals BP Pulse Height(growth percentile) Weight(growth percentile) SpO2 BMI  
 130/70 (!) 56 6' 2\" (1.88 m) 215 lb (97.5 kg) 98% 27.6 kg/m2 Smoking Status Former Smoker Vitals History BMI and BSA Data Body Mass Index Body Surface Area  
 27.6 kg/m 2 2.26 m 2 Preferred Pharmacy Pharmacy Name Phone [phone] - [place], [address] [facility] [address] AT [address] [phone] Your Updated Medication List  
  
   
 This list is accurate as of 10/9/18  9:45 AM.  Always use your most recent med list.  
  
  
  
  
 aspirin delayed-release 81 mg tablet Take  by mouth daily. FISH OIL 1,000 mg Cap Generic drug:  omega-3 fatty acids-vitamin e Take 1 Cap by mouth. FLONASE ALLERGY RELIEF 50 mcg/actuation nasal spray Generic drug:  fluticasone 2 Sprays by Both Nostrils route daily. LUMIGAN OP Instill  in eye.  
  
 metoprolol succinate 25 mg XL tablet Commonly known as:  TOPROL-XL TaKe 1 Tablet orally once a day  
  
 multivitamin tablet Commonly known as:  ONE A DAY Take 1 Tab by mouth daily. SIMBRINZA 1-0.2 % Drps Generic drug:  brinzolamide-brimonidine Apply  to eye.  
  
 simvastatin 80 mg tablet Commonly known as:  ZOCOR Take 1 Tab by mouth nightly. therapeutic multivitamin tablet Commonly known as:  Dorathy Cecilia Take 1 Tab by Mouth Once a Day. timolol 0.5 % ophthalmic gel-forming Commonly known as:  TIMOPTIC-XE INSTILL 1 DROP IN BOTH EYES DAILY We Performed the Following AMB POC EKG ROUTINE W/ 12 LEADS, INTER & REP [19700 CPT(R)] Introducing [facility]! Dear Nimesh Flynn: Thank you for requesting a [employer] account. Our records indicate that you already have an active [employer] account. You can access your account anytime at [email or web address] Did you know that you can access your hospital and ER discharge instructions at any time in [employer]? You can also review all of your test results from your hospital stay or ER visit. Additional Information If you have questions, please visit the Frequently Asked Questions section of the [employer] website at [email or web address] Remember, [employer] is NOT to be used for urgent needs. For medical emergencies, dial 911. Now available from your iPhone and Android! Please provide this summary of care documentation to your next provider. Your primary care clinician is listed as Trent Thompson. If you have any questions after today's visit, please call [phone].

## 2018-10-09 NOTE — PROGRESS NOTES
Manny Skyaaron presents today for   Chief Complaint   Patient presents with    Follow-up     6 month - no cardiac complaints       Manny Turner preferred language for health care discussion is english/other. Is someone accompanying this pt? No    Is the patient using any DME equipment during OV? No    Depression Screening:  PHQ over the last two weeks 2/1/2018   Little interest or pleasure in doing things Not at all   Feeling down, depressed, irritable, or hopeless Not at all   Total Score PHQ 2 0       Learning Assessment:  Learning Assessment 7/30/2015   PRIMARY LEARNER Patient   BARRIERS PRIMARY LEARNER [address] CAREGIVER No   PRIMARY LANGUAGE ENGLISH   LEARNER PREFERENCE PRIMARY LISTENING   ANSWERED BY patient   RELATIONSHIP SELF       Abuse Screening:  Abuse Screening Questionnaire 2/1/2018   Do you ever feel afraid of your partner? N   Are you in a relationship with someone who physically or mentally threatens you? N   Is it safe for you to go home? Y       Fall Risk  Fall Risk Assessment, last 12 mths 2/1/2018   Able to walk? Yes   Fall in past 12 months? No       Pt currently taking Antiplatelet therapy? Aspirin    Coordination of Care:  1. Have you been to the ER, urgent care clinic since your last visit? Hospitalized since your last visit? No    2. Have you seen or consulted any other health care providers outside of the [facility] since your last visit? Include any pap smears or colon screening.  No

## 2018-10-09 NOTE — PROGRESS NOTES
HISTORY OF PRESENT ILLNESS  Anish Rivera is a 66 y.o. male. HPI  He has been feeling very well. He is scheduled to have eye surgery for glaucoma. He denies any cardiac symptoms. He denies chest pain, dyspnea, orthopnea, PND. He denies palpitation, dizziness or syncope. He denies any symptoms of TIA or amaurosis fugax. His blood pressure has been under control. We have not been able to obtain any medical records from [place]. He is a nonsmoker. He has history of coronary artery disease and had stent placement in January of 2004 in [place] at [facility]. 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 He has history of hypertension and hypercholesterolemia. He has a family history of prostate cancer. He denies diabetes mellitus. Review of Systems   Constitutional: Negative for malaise/fatigue and weight loss. HENT: Negative for hearing loss. Eyes: Negative for blurred vision and double vision. Respiratory: Negative for shortness of breath. Cardiovascular: Negative for chest pain, palpitations, orthopnea, claudication, leg swelling and PND. Gastrointestinal: Negative for blood in stool, heartburn and melena. Genitourinary: Positive for frequency. Negative for dysuria, hematuria and urgency. Musculoskeletal: Negative for back pain and joint pain. Skin: Negative for itching and rash. Neurological: Negative for dizziness, loss of consciousness and weakness. Psychiatric/Behavioral: Negative for depression and memory loss. Physical Exam   Constitutional: He is oriented to person, place, and time. He appears well-developed and well-nourished. HENT:   Head: Normocephalic and atraumatic. Eyes: Conjunctivae are normal. Pupils are equal, round, and reactive to light. Neck: Normal range of motion. Neck supple. No JVD present.    Cardiovascular: Regular rhythm, S1 normal and S2 normal.   No extrasystoles are present. Bradycardia present. PMI is not displaced. Exam reveals no gallop and no friction rub. No murmur heard. Pulses:       Carotid pulses are 3+ on the right side, and 3+ on the left side. Pulmonary/Chest: Effort normal. He has no rales. Abdominal: Soft. There is no tenderness. Musculoskeletal: He exhibits no edema. Neurological: He is alert and oriented to person, place, and time. No cranial nerve deficit. Skin: Skin is warm and dry. Psychiatric: He has a normal mood and affect. His behavior is normal.     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      Social History Main Topics    Smoking status: Former Smoker     Years: 2.00     Quit date: 6/16/1961    Smokeless tobacco: Never Used    Alcohol use Yes      Comment: socially    Drug use: Not on file    Sexual activity: Not on file     Other Topics Concern    Not on file     Social History Narrative       Family History   Problem Relation Age of Onset    Cancer Father      prostate    Cancer Other     Heart Disease Other     Diabetes Other        Past Surgical History:   Procedure Laterality Date    CARDIAC SURG PROCEDURE UNLIST coronary artery stent placement    HX HERNIA REPAIR      right inguinal herniorrhapy    NM PROSTATE BIOPSY, NEEDLE, SATURATION SAMPLING         Current Outpatient Prescriptions   Medication Sig Dispense Refill    simvastatin (ZOCOR) 80 mg tablet Take 1 Tab by mouth nightly. 90 Tab 3    metoprolol succinate (TOPROL-XL) 25 mg XL tablet TaKe 1 Tablet orally once a day 90 Tab 3    fluticasone (FLONASE ALLERGY RELIEF) 50 mcg/actuation nasal spray 2 Sprays by Both Nostrils route daily.  timolol (TIMOPTIC-XE) 0.5 % ophthalmic gel-forming INSTILL 1 DROP IN BOTH EYES DAILY  0    brinzolamide-brimonidine (SIMBRINZA) 1-0.2 % drps Apply  to eye.  BIMATOPROST (LUMIGAN OP) Instill  in eye.  therapeutic multivitamin (THERAGRAN) tablet Take 1 Tab by Mouth Once a Day.  multivitamin (ONE A DAY) tablet Take 1 Tab by mouth daily.  aspirin delayed-release 81 mg tablet Take  by mouth daily.  omega-3 fatty acids-vitamin e (FISH OIL) 1,000 mg Cap Take 1 Cap by mouth. EKG: unchanged from previous tracings, normal sinus rhythm, nonspecific ST and T waves changes, inf.scar  . ASSESSMENT and PLAN  Encounter Diagnoses   Name Primary?  History of coronary artery disease, s/p stent Jan. 2004  Yes    Hypercholesteremia    He has been doing well. He has had no symptoms to indicate angina or cardiac decompensation. He does not have any physical findings of decompensated congestive heart failure. He has been active. He is scheduled to have eye surgery for glaucoma. We discussed the stress nuclear cardiac imaging, but patient declined to have a stress nuclear cardiac imaging prior to surgery, because of the cost issue. Since he has been completely asymptomatic, I would allow him to have eye surgery without the stress test at this time. He will be cleared for scheduled eye surgery from a cardiac standpoint.

## 2019-02-04 ENCOUNTER — OFFICE VISIT (OUTPATIENT)
Dept: UROLOGY | Age: 79
End: 2019-02-04

## 2019-02-04 ENCOUNTER — HOSPITAL ENCOUNTER (OUTPATIENT)
Dept: LAB | Age: 79
Discharge: HOME | End: 2019-02-04
Payer: COMMERCIAL

## 2019-02-04 VITALS
WEIGHT: 215 LBS | BODY MASS INDEX: 27.59 KG/M2 | SYSTOLIC BLOOD PRESSURE: 118 MMHG | HEART RATE: 64 BPM | HEIGHT: 74 IN | OXYGEN SATURATION: 98 % | DIASTOLIC BLOOD PRESSURE: 70 MMHG

## 2019-02-04 DIAGNOSIS — C61 PROSTATE CANCER (HCC): ICD-10-CM

## 2019-02-04 DIAGNOSIS — C61 PROSTATE CANCER (HCC): Primary | ICD-10-CM

## 2019-02-04 LAB
BILIRUB UR QL STRIP: NEGATIVE
GLUCOSE UR-MCNC: NEGATIVE MG/DL
KETONES P FAST UR STRIP-MCNC: NEGATIVE MG/DL
PH UR STRIP: 6 [PH] (ref 4.6–8)
PROT UR QL STRIP: NEGATIVE
PSA SERPL-MCNC: 2.1 NG/ML (ref 0–4)
SP GR UR STRIP: 1.02 (ref 1–1.03)
UA UROBILINOGEN AMB POC: NORMAL (ref 0.2–1)
URINALYSIS CLARITY POC: CLEAR
URINALYSIS COLOR POC: YELLOW
URINE BLOOD POC: NORMAL
URINE LEUKOCYTES POC: NEGATIVE
URINE NITRITES POC: NEGATIVE

## 2019-02-04 PROCEDURE — 84153 ASSAY OF PSA TOTAL: CPT

## 2019-02-04 NOTE — PATIENT INSTRUCTIONS
Prostate Cancer: Care Instructions  Your Care Instructions    The prostate gland is a small, walnut-shaped organ that lies just below a man's bladder. It surrounds the urethra, the tube that carries urine from the bladder out of the body through the penis. Prostate cancer is the abnormal growth of cells in the prostate gland. Prostate cancer cells can spread within the prostate, to nearby lymph nodes and other tissues, and to other parts of the body. When the cancer hasn't spread outside the prostate, it is called localized prostate cancer. With localized prostate cancer, your options depend on how likely it is that your cancer will grow. Tests will show if your cancer is likely to grow. · Low-risk cancer isn't likely to grow right away. If your cancer is low-risk, you can choose active surveillance. This means your cancer will be watched closely by your doctor with regular checkups and tests to see if the cancer grows. This choice allows you to delay having surgery or radiation, often for many years. If the cancer grows very slowly, you may never need treatment. · Medium-risk cancer is more likely to grow. Some men with this type of cancer may be able to choose active surveillance. Others may need to choose surgery or radiation. · High-risk cancer is most likely to grow. If you have high-risk cancer, you will likely need to choose surgery or radiation. If your cancer has already spread outside the prostate or to other parts of the body, then you may have other treatments, like chemotherapy or hormone therapy. If you are over age [de-identified] or have other serious health problems, like heart disease, you may choose not to have treatments to cure your cancer. Instead, you can just have treatments to manage your symptoms. This is called watchful waiting. Finding out that you have cancer is scary. You may feel many emotions and may need some help coping. Seek out family, friends, and counselors for support.  You also can do things at home to make yourself feel better while you go through treatment. Call the [facility] ([phone]) or visit its website at [address] "[employer] for more information. Follow-up care is a key part of your treatment and safety. Be sure to make and go to all appointments, and call your doctor if you are having problems. It's also a good idea to know your test results and keep a list of the medicines you take. How can you care for yourself at home? · Your doctor will talk to you about your treatment options. You may need to learn more about each of them before you can decide which treatment is best for you. · Take your medicines exactly as prescribed. Call your doctor if you think you are having a problem with your medicine. You will get more details on the specific medicines your doctor prescribes. · Eat healthy food. If you do not feel like eating, try to eat food that has protein and extra calories to keep up your strength and prevent weight loss. Drink liquid meal replacements for extra calories and protein. Try to eat your main meal early. · Take steps to control your stress and workload. Learn relaxation techniques. ? Share your feelings. Stress and tension affect our emotions. By expressing your feelings to others, you may be able to understand and cope with them. ? Consider joining a support group. Talking about a problem with your spouse, a good friend, or other people with similar problems is a good way to reduce tension and stress. ? Express yourself through art. Try writing, crafts, dance, or art to relieve stress. Some dance, writing, or art groups may be available just for people who have cancer. ? Be kind to your body and mind. Getting enough sleep, eating a healthy diet, and taking time to do things you enjoy can contribute to an overall feeling of balance in your life and can help reduce stress. ? Get help if you need it.  Discuss your concerns with your doctor or counselor. · Get some physical activity every day, but do not get too tired. Keep doing the hobbies you enjoy as your energy allows. · If you are vomiting or have diarrhea:  ? Drink plenty of fluids (enough so that your urine is light yellow or clear like water) to prevent dehydration. Choose water and other caffeine-free clear liquids. If you have kidney, heart, or liver disease and have to limit fluids, talk with your doctor before you increase the amount of fluids you drink. ? When you are able to eat, try clear soups, mild foods, and liquids until all symptoms are gone for 12 to 48 hours. Jell-O, dry toast, crackers, and cooked cereal are also good choices. · If you have not already done so, prepare a list of advance directives. Advance directives are instructions to your doctor and family members about what kind of care you want if you become unable to speak or express yourself. When should you call for help? Call 911 anytime you think you may need emergency care. For example, call if:    · You passed out (lost consciousness).    Call your doctor now or seek immediate medical care if:    · You have new or worse pain.     · You have new symptoms, such as a cough, belly pain, vomiting, diarrhea, or a rash.     · You have symptoms of a urinary tract infection. For example:  ? You have blood or pus in your urine. ? You have pain in your back just below your rib cage. This is called flank pain. ? You have a fever, chills, or body aches. ? It hurts to urinate. ? You have groin or belly pain.    Watch closely for changes in your health, and be sure to contact your doctor if:    · You have swollen glands in your armpits, groin, or neck.     · You have trouble controlling your urine.     · You do not get better as expected. Where can you learn more? Go to http://haley-travis.info/. Enter V141 in the search box to learn more about \"Prostate Cancer: Care Instructions. \"  Current as of: March 27, 2018  Content Version: 11.9  © 7771-6432 [employer]. Care instructions adapted under license by [employer] (which disclaims liability or warranty for this information). If you have questions about a medical condition or this instruction, always ask your healthcare professional. [place] [address] any warranty or liability for your use of this information.

## 2019-02-04 NOTE — PROGRESS NOTES
Mr. Jasson Dennison has a reminder for a \"due or due soon\" health maintenance. I have asked that he contact his primary care provider for follow-up on this health maintenance.

## 2019-02-04 NOTE — PROGRESS NOTES
Maggi Rangel 68 y.o. male    Prostate Carcinoma [address] 6 Histology/PSA 5Jxwda2/XRT 2005     Mr. Tree Fay seen today for prostate carcinoma follow-up  Patient is voiding well with a forceful stream good control nocturia once or twice per night-no complaints regarding urination at this time        Patient has had low-level undulating PSA elevation for the past 8 years     PSA 1.8 in February 2016  PSA 2.9 2010                                                        negative bone scan in 2011  PSA 2.2 in June 2012  PSA 2.8 2013  PSA 6.8 2014  PSA 3.8 in August 2014  PSA 2.3 in July 2015  PSA 2.5 in August 2016  PSA 2.1 in February 2017  PSA 2.3 in February 2018      Review of Systems:    CNS: Coronary artery disease  Respiratory: No wheezing or shortness of breath  Cardiovascular:Coronary artery disease/hypertension  Intestinal:No GERD or constipation  Urinary: Kidney stone disease  Skeletal: DJD  Endocrine:No diabetes or thyroid disease  Other:  Other:         Allergies: No Known Allergies   Medications:    Current Outpatient Medications   Medication Sig Dispense Refill    simvastatin (ZOCOR) 80 mg tablet Take 1 Tab by mouth nightly. 90 Tab 3    metoprolol succinate (TOPROL-XL) 25 mg XL tablet TaKe 1 Tablet orally once a day 90 Tab 3    fluticasone (FLONASE ALLERGY RELIEF) 50 mcg/actuation nasal spray 2 Sprays by Both Nostrils route daily.  timolol (TIMOPTIC-XE) 0.5 % ophthalmic gel-forming INSTILL 1 DROP IN BOTH EYES DAILY  0    brinzolamide-brimonidine (SIMBRINZA) 1-0.2 % drps Apply  to eye.  BIMATOPROST (LUMIGAN OP) Instill  in eye.  therapeutic multivitamin (THERAGRAN) tablet Take 1 Tab by Mouth Once a Day.  multivitamin (ONE A DAY) tablet Take 1 Tab by mouth daily.  aspirin delayed-release 81 mg tablet Take  by mouth daily.  omega-3 fatty acids-vitamin e (FISH OIL) 1,000 mg Cap Take 1 Cap by mouth.          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 and other lower urinary tract symptoms (LUTS)       Past Surgical History:   Procedure Laterality Date    CARDIAC SURG PROCEDURE UNLIST      coronary artery stent placement    HX HERNIA REPAIR      right inguinal herniorrhapy    VT PROSTATE BIOPSY, NEEDLE, SATURATION SAMPLING       Social History     Socioeconomic History    Marital status:      Spouse name: Not on file    Number of children: Not on file    Years of education: Not on file    Highest education level: Not on file   Social Needs    Financial resource strain: Not on file    Food insecurity - worry: Not on file    Food insecurity - inability: Not on file   [place] [employer] needs - medical: Not on file   [employer] needs - non-medical: Not on file   Occupational History    Not on file   Tobacco Use    Smoking status: Former Smoker     Years: 2.00     Last attempt to quit: 1961     Years since quittin.11    Smokeless tobacco: Never Used   Substance and Sexual Activity    Alcohol use: Yes     Comment: socially    Drug use: Not on file    Sexual activity: Not on file   Other Topics Concern    Not on file   Social History Narrative    Not on file      Family History   Problem Relation Age of Onset    Cancer Father         prostate    Cancer Other     Heart Disease Other     Diabetes Other         Physical Examination: Well-nourished mature male in no apparent distress    Prostate by MICHAEL is small smooth flat nontender no nodularity  No rectal masses induration or tenderness      Urinalysis: Negative dipstick/nitrite negative    Impression: Prostate carcinoma stable 14 years status post XRT        Plan: PSA today    RTC 1 year PSA MICHAEL PVR      More than 1/2 of this 15 minute visit was spent in counselling and coordination of care, as described above. Arianna Francois MD  -electronically signed-    PLEASE NOTE:  This document has been produced using voice recognition software. Unrecognized errors in transcription may be present.

## 2019-04-09 ENCOUNTER — OFFICE VISIT (OUTPATIENT)
Dept: CARDIOLOGY CLINIC | Age: 79
End: 2019-04-09

## 2019-04-09 VITALS
OXYGEN SATURATION: 98 % | BODY MASS INDEX: 28.23 KG/M2 | DIASTOLIC BLOOD PRESSURE: 86 MMHG | SYSTOLIC BLOOD PRESSURE: 132 MMHG | HEART RATE: 61 BPM | HEIGHT: 74 IN | WEIGHT: 220 LBS

## 2019-04-09 DIAGNOSIS — E78.00 HYPERCHOLESTEREMIA: ICD-10-CM

## 2019-04-09 DIAGNOSIS — I25.10 CORONARY ARTERY DISEASE INVOLVING NATIVE CORONARY ARTERY OF NATIVE HEART WITHOUT ANGINA PECTORIS: Primary | ICD-10-CM

## 2019-04-09 RX ORDER — METOPROLOL SUCCINATE 25 MG/1
TABLET, EXTENDED RELEASE ORAL
Qty: 90 TAB | Refills: 3 | Status: SHIPPED | OUTPATIENT
Start: 2019-04-09 | End: 2020-04-06

## 2019-04-09 NOTE — PROGRESS NOTES
HISTORY OF PRESENT ILLNESS  Linda Ferris is a 66 y.o. male. HPI  He has been doing well. He denies any cardiac symptoms. He has had no chest pain, dyspnea, orthopnea, PND. He denies palpitation, dizziness or syncope. He denies any symptoms of TIA or amaurosis fugax. His blood pressure has been under control. He continues with issues with glaucoma. He is a nonsmoker. He has history of coronary artery disease and had stent placement in January of 2004 in [place] at [facility]. 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 He has history of hypertension and hypercholesterolemia. He has a family history of prostate cancer. He denies diabetes mellitus. Review of Systems   Constitutional: Negative for malaise/fatigue and weight loss. HENT: Negative for hearing loss. Eyes: Negative for blurred vision and double vision. Respiratory: Negative for shortness of breath. Cardiovascular: Negative for chest pain, palpitations, orthopnea, claudication, leg swelling and PND. Gastrointestinal: Negative for blood in stool, heartburn and melena. Genitourinary: Positive for frequency. Negative for dysuria, hematuria and urgency. Musculoskeletal: Negative for back pain and joint pain. Skin: Negative for itching and rash. Neurological: Negative for dizziness and loss of consciousness. Psychiatric/Behavioral: Negative for depression and memory loss. Physical Exam   Constitutional: He is oriented to person, place, and time. He appears well-developed and well-nourished. HENT:   Head: Normocephalic and atraumatic. Eyes: Pupils are equal, round, and reactive to light. Conjunctivae are normal.   Neck: Normal range of motion. Neck supple. No JVD present. Cardiovascular: Normal rate, regular rhythm, S1 normal and S2 normal.  No extrasystoles are present. PMI is not displaced.  Exam reveals no gallop and no friction rub. No murmur heard. Pulses:       Carotid pulses are 3+ on the right side, and 3+ on the left side. Pulmonary/Chest: Effort normal. He has no rales. Abdominal: Soft. There is no tenderness. Musculoskeletal: He exhibits no edema. Neurological: He is alert and oriented to person, place, and time. No cranial nerve deficit. Skin: Skin is warm and dry. Psychiatric: He has a normal mood and affect.  His behavior is normal.     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 and other lower urinary tract symptoms (LUTS)        Social History     Socioeconomic History    Marital status:      Spouse name: Not on file    Number of children: Not on file    Years of education: Not on file    Highest education level: Not on file   Occupational History    Not on file   Social Needs    Financial resource strain: Not on file    Food insecurity:     Worry: Not on file     Inability: Not on file    Transportation needs:     Medical: Not on file     Non-medical: Not on file   Tobacco Use    Smoking status: Former Smoker     Years: 2.00     Last attempt to quit: 1961     Years since quittin.8    Smokeless tobacco: Never Used   Substance and Sexual Activity    Alcohol use: Yes     Comment: socially    Drug use: Not on file    Sexual activity: Not on file   Lifestyle    Physical activity:     Days per week: Not on file     Minutes per session: Not on file    Stress: Not on file   Relationships    Social connections:     Talks on phone: Not on file     Gets together: Not on file     Attends Orthodox service: Not on file     Active member of club or organization: Not on file     Attends meetings of clubs or organizations: Not on file     Relationship status: Not on file    Intimate partner violence:     Fear of current or ex partner: Not on file     Emotionally abused: Not on file     Physically abused: Not on file     Forced sexual activity: Not on file   Other Topics Concern    Not on file   Social History Narrative    Not on file       Family History   Problem Relation Age of Onset    Cancer Father         prostate    Cancer Other     Heart Disease Other     Diabetes Other        Past Surgical History:   Procedure Laterality Date    CARDIAC SURG PROCEDURE UNLIST      coronary artery stent placement    HX HERNIA REPAIR      right inguinal herniorrhapy    [place] PROSTATE BIOPSY, NEEDLE, SATURATION SAMPLING         Current Outpatient Medications   Medication Sig Dispense Refill    simvastatin (ZOCOR) 80 mg tablet Take 1 Tab by mouth nightly. 90 Tab 3    metoprolol succinate (TOPROL-XL) 25 mg XL tablet TaKe 1 Tablet orally once a day 90 Tab 3    fluticasone (FLONASE ALLERGY RELIEF) 50 mcg/actuation nasal spray 2 Sprays by Both Nostrils route daily.  timolol (TIMOPTIC-XE) 0.5 % ophthalmic gel-forming INSTILL 1 DROP IN BOTH EYES DAILY  0    brinzolamide-brimonidine (SIMBRINZA) 1-0.2 % drps Apply  to eye.  BIMATOPROST (LUMIGAN OP) Instill  in eye.  therapeutic multivitamin (THERAGRAN) tablet Take 1 Tab by Mouth Once a Day.  multivitamin (ONE A DAY) tablet Take 1 Tab by mouth daily.  aspirin delayed-release 81 mg tablet Take  by mouth daily.  omega-3 fatty acids-vitamin e (FISH OIL) 1,000 mg Cap Take 1 Cap by mouth.          EKG: unchanged from previous tracings, normal sinus rhythm, nonspecific ST and T waves changes, inf.scar  . ASSESSMENT and PLAN  Encounter Diagnoses   Name Primary?  History of coronary artery disease, s/p stent Jan. 2004  Yes    Hypercholesteremia    He has been doing well. He has had no symptoms to indicate angina or cardiac decompensation. His blood pressure has been under control. It does not appear that he has had cholesterol profile in the past year or so and I would repeat this along with LFTs. He does not wish to have follow up stress testing. As he is asymptomatic, I would continue on current treatment.  We have not received the medical records from [place] and it appears he most likely had stenting of the RCA since he most likely had an inferior wall myocardial infarction before the stenting based on his electrocardiogram.

## 2019-04-16 ENCOUNTER — HOSPITAL ENCOUNTER (OUTPATIENT)
Dept: LAB | Age: 79
Discharge: HOME | End: 2019-04-16
Payer: COMMERCIAL

## 2019-04-16 DIAGNOSIS — E78.00 HYPERCHOLESTEREMIA: ICD-10-CM

## 2019-04-16 LAB
ALBUMIN SERPL-MCNC: 3.8 G/DL (ref 3.4–5)
ALBUMIN/GLOB SERPL: 1.5 {RATIO} (ref 0.8–1.7)
ALP SERPL-CCNC: 55 U/L (ref 45–117)
ALT SERPL-CCNC: 25 U/L (ref 16–61)
AST SERPL-CCNC: 15 U/L (ref 15–37)
BILIRUB DIRECT SERPL-MCNC: 0.2 MG/DL (ref 0–0.2)
BILIRUB SERPL-MCNC: 0.6 MG/DL (ref 0.2–1)
CHOLEST SERPL-MCNC: 140 MG/DL
GLOBULIN SER CALC-MCNC: 2.6 G/DL (ref 2–4)
HDLC SERPL-MCNC: 51 MG/DL (ref 40–60)
HDLC SERPL: 2.7 {RATIO} (ref 0–5)
LDLC SERPL CALC-MCNC: 72.8 MG/DL (ref 0–100)
LIPID PROFILE,FLP: NORMAL
PROT SERPL-MCNC: 6.4 G/DL (ref 6.4–8.2)
TRIGL SERPL-MCNC: 81 MG/DL (ref ?–150)
VLDLC SERPL CALC-MCNC: 16.2 MG/DL

## 2019-04-16 PROCEDURE — 80061 LIPID PANEL: CPT

## 2019-04-16 PROCEDURE — 80076 HEPATIC FUNCTION PANEL: CPT

## 2019-04-16 PROCEDURE — 36415 COLL VENOUS BLD VENIPUNCTURE: CPT

## 2019-04-17 ENCOUNTER — TELEPHONE (OUTPATIENT)
Dept: CARDIOLOGY CLINIC | Age: 79
End: 2019-04-17

## 2019-04-17 DIAGNOSIS — E78.00 HYPERCHOLESTEREMIA: Primary | ICD-10-CM

## 2019-10-09 ENCOUNTER — HOSPITAL ENCOUNTER (OUTPATIENT)
Dept: LAB | Age: 79
Discharge: HOME | End: 2019-10-09
Payer: MEDICARE

## 2019-10-09 DIAGNOSIS — E78.00 HYPERCHOLESTEREMIA: ICD-10-CM

## 2019-10-09 LAB
ALBUMIN SERPL-MCNC: 3.6 G/DL (ref 3.4–5)
ALBUMIN/GLOB SERPL: 1.4 {RATIO} (ref 0.8–1.7)
ALP SERPL-CCNC: 57 U/L (ref 45–117)
ALT SERPL-CCNC: 30 U/L (ref 16–61)
AST SERPL-CCNC: 19 U/L (ref 10–38)
BILIRUB DIRECT SERPL-MCNC: 0.2 MG/DL (ref 0–0.2)
BILIRUB SERPL-MCNC: 0.6 MG/DL (ref 0.2–1)
CHOLEST SERPL-MCNC: 140 MG/DL
GLOBULIN SER CALC-MCNC: 2.6 G/DL (ref 2–4)
HDLC SERPL-MCNC: 51 MG/DL (ref 40–60)
HDLC SERPL: 2.7 {RATIO} (ref 0–5)
LDLC SERPL CALC-MCNC: 72.4 MG/DL (ref 0–100)
LIPID PROFILE,FLP: NORMAL
PROT SERPL-MCNC: 6.2 G/DL (ref 6.4–8.2)
TRIGL SERPL-MCNC: 83 MG/DL (ref ?–150)
VLDLC SERPL CALC-MCNC: 16.6 MG/DL

## 2019-10-09 PROCEDURE — 80061 LIPID PANEL: CPT

## 2019-10-09 PROCEDURE — 80076 HEPATIC FUNCTION PANEL: CPT

## 2019-10-09 PROCEDURE — 36415 COLL VENOUS BLD VENIPUNCTURE: CPT

## 2019-10-15 ENCOUNTER — OFFICE VISIT (OUTPATIENT)
Dept: CARDIOLOGY CLINIC | Age: 79
End: 2019-10-15

## 2019-10-15 VITALS
WEIGHT: 222 LBS | HEIGHT: 74 IN | OXYGEN SATURATION: 97 % | SYSTOLIC BLOOD PRESSURE: 130 MMHG | HEART RATE: 54 BPM | DIASTOLIC BLOOD PRESSURE: 76 MMHG | BODY MASS INDEX: 28.49 KG/M2

## 2019-10-15 DIAGNOSIS — I25.10 CORONARY ARTERY DISEASE INVOLVING NATIVE CORONARY ARTERY OF NATIVE HEART WITHOUT ANGINA PECTORIS: Primary | ICD-10-CM

## 2019-10-15 DIAGNOSIS — E78.00 HYPERCHOLESTEREMIA: ICD-10-CM

## 2019-10-15 NOTE — PROGRESS NOTES
Shaina Negron presents today for   Chief Complaint   Patient presents with    Coronary Artery Disease     6 month - no cardiac complaints       Shaina Negron preferred language for health care discussion is english/other. Is someone accompanying this pt? no    Is the patient using any DME equipment during [address]? no    Depression Screening:  3 most recent PHQ Screens 2/1/2018   Little interest or pleasure in doing things Not at all   Feeling down, depressed, irritable, or hopeless Not at all   Total Score PHQ 2 0       Learning Assessment:  Learning Assessment 7/30/2015   PRIMARY LEARNER Patient   BARRIERS PRIMARY LEARNER [address] CAREGIVER No   PRIMARY LANGUAGE ENGLISH   LEARNER PREFERENCE PRIMARY LISTENING   ANSWERED BY patient   RELATIONSHIP SELF       Abuse Screening:  Abuse Screening Questionnaire 2/1/2018   Do you ever feel afraid of your partner? N   Are you in a relationship with someone who physically or mentally threatens you? N   Is it safe for you to go home? Y       Fall Risk  Fall Risk Assessment, last 12 mths 2/1/2018   Able to walk? Yes   Fall in past 12 months? No       Pt currently taking Anticoagulant therapy? no    Coordination of Care:  1. Have you been to the ER, urgent care clinic since your last visit? Hospitalized since your last visit? no    2. Have you seen or consulted any other health care providers outside of the [address] [place] [address] since your last visit? Include any pap smears or colon screening.  no

## 2019-10-15 NOTE — PROGRESS NOTES
HISTORY OF PRESENT ILLNESS  Broadus Alpers is a 78 y.o. male. HPI  He has been feeling well. He has had no chest pain, dyspnea, orthopnea, PND. He denies palpitations, dizziness or syncope. He has had no symptoms to indicate TIA or amaurosis fugax. His blood pressure has been under control. His cholesterol profile has been satisfactory. He is a nonsmoker. He has history of coronary artery disease and had stent placement in January of 2004 in [place] at [facility]. 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 He has history of hypertension and hypercholesterolemia. He has a family history of prostate cancer. He denies diabetes mellitus.     Review of Systems   Constitutional: Negative for malaise/fatigue and weight loss. HENT: Negative for hearing loss. Eyes: Negative for blurred vision and double vision. Respiratory: Negative for shortness of breath. Cardiovascular: Negative for chest pain, palpitations, orthopnea, claudication, leg swelling and PND. Gastrointestinal: Negative for blood in stool, heartburn and melena. Genitourinary: Positive for frequency. Negative for dysuria, hematuria and urgency. Musculoskeletal: Positive for back pain. Negative for joint pain. Skin: Negative for itching and rash. Neurological: Negative for dizziness and loss of consciousness. Psychiatric/Behavioral: Negative for depression and memory loss. Physical Exam   Constitutional: He is oriented to person, place, and time. He appears well-developed and well-nourished. HENT:   Head: Normocephalic and atraumatic. Eyes: Pupils are equal, round, and reactive to light. Conjunctivae are normal.   Neck: Normal range of motion. Neck supple. No JVD present. Cardiovascular: Normal rate, regular rhythm, S1 normal and S2 normal.  No extrasystoles are present. PMI is not displaced.  Exam reveals no gallop and no friction rub. No murmur heard. Pulses:       Carotid pulses are 3+ on the right side, and 3+ on the left side. Pulmonary/Chest: Effort normal. He has no rales. Abdominal: Soft. There is no tenderness. Musculoskeletal: He exhibits no edema. Neurological: He is alert and oriented to person, place, and time. No cranial nerve deficit. Skin: Skin is warm and dry. Psychiatric: He has a normal mood and affect.  His behavior is normal.     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 and other lower urinary tract symptoms (LUTS)        Social History     Socioeconomic History    Marital status:      Spouse name: Not on file    Number of children: Not on file    Years of education: Not on file    Highest education level: Not on file   Occupational History    Not on file   Social Needs    Financial resource strain: Not on file    Food insecurity:     Worry: Not on file     Inability: Not on file    Transportation needs:     Medical: Not on file     Non-medical: Not on file   Tobacco Use    Smoking status: Former Smoker     Years: 2.00     Last attempt to quit: 1961     Years since quittin.3    Smokeless tobacco: Never Used   Substance and Sexual Activity    Alcohol use: Yes     Comment: socially    Drug use: Not on file    Sexual activity: Not on file   Lifestyle    Physical activity:     Days per week: Not on file     Minutes per session: Not on file    Stress: Not on file   Relationships    Social connections:     Talks on phone: Not on file     Gets together: Not on file     Attends Yazidi service: Not on file     Active member of club or organization: Not on file     Attends meetings of clubs or organizations: Not on file     Relationship status: Not on file    Intimate partner violence:     Fear of current or ex partner: Not on file     Emotionally abused: Not on file     Physically abused: Not on file     Forced sexual activity: Not on file   Other Topics Concern    Not on file   Social History Narrative    Not on file       Family History   Problem Relation Age of Onset    Cancer Father         prostate    Cancer Other     Heart Disease Other     Diabetes Other        Past Surgical History:   Procedure Laterality Date    CARDIAC SURG PROCEDURE UNLIST      coronary artery stent placement    HX HERNIA REPAIR      right inguinal herniorrhapy    [place] PROSTATE BIOPSY, NEEDLE, SATURATION SAMPLING         Current Outpatient Medications   Medication Sig Dispense Refill    metoprolol succinate (TOPROL-XL) 25 mg XL tablet TaKe 1 Tablet orally once a day 90 Tab 3    simvastatin (ZOCOR) 80 mg tablet Take 1 Tab by mouth nightly. 90 Tab 3    fluticasone (FLONASE ALLERGY RELIEF) 50 mcg/actuation nasal spray 2 Sprays by Both Nostrils route daily.  timolol (TIMOPTIC-XE) 0.5 % ophthalmic gel-forming INSTILL 1 DROP IN BOTH EYES DAILY  0    brinzolamide-brimonidine (SIMBRINZA) 1-0.2 % drps Apply  to eye.  BIMATOPROST (LUMIGAN OP) Instill  in eye.  therapeutic multivitamin (THERAGRAN) tablet Take 1 Tab by Mouth Once a Day.  multivitamin (ONE A DAY) tablet Take 1 Tab by mouth daily.  aspirin delayed-release 81 mg tablet Take  by mouth daily.  omega-3 fatty acids-vitamin e (FISH OIL) 1,000 mg Cap Take 1 Cap by mouth.          EKG: unchanged from previous tracings, nonspecific ST and T waves changes, sinus bradycardia, inf.scar  . ASSESSMENT and PLAN  Encounter Diagnoses   Name Primary?  History of coronary artery disease, s/p stent Jan. 2004  Yes    Hypercholesteremia    He has been doing well. He has had no symptoms to indicate angina or cardiac decompensation. His blood pressure has been under control. His cholesterol profile has been satisfactory. At this point he will have a follow up stress nuclear cardiac imaging.

## 2019-10-15 NOTE — PATIENT INSTRUCTIONS
Treadmill nuc for cad  Follow up 6 months  If you have not heard from the central scheduler to schedule your testing in 48 hours, please call [phone].

## 2019-10-22 RX ORDER — SIMVASTATIN 80 MG/1
TABLET, FILM COATED ORAL
Qty: 90 TAB | Refills: 0 | Status: SHIPPED | OUTPATIENT
Start: 2019-10-22 | End: 2020-01-16

## 2019-12-18 ENCOUNTER — HOSPITAL ENCOUNTER (OUTPATIENT)
Dept: NON INVASIVE DIAGNOSTICS | Age: 79
Discharge: HOME | End: 2019-12-18
Attending: INTERNAL MEDICINE
Payer: COMMERCIAL

## 2019-12-18 VITALS
HEIGHT: 74 IN | BODY MASS INDEX: 28.49 KG/M2 | WEIGHT: 222 LBS | DIASTOLIC BLOOD PRESSURE: 80 MMHG | SYSTOLIC BLOOD PRESSURE: 138 MMHG

## 2019-12-18 DIAGNOSIS — I25.10 CORONARY ARTERY DISEASE INVOLVING NATIVE CORONARY ARTERY OF NATIVE HEART WITHOUT ANGINA PECTORIS: ICD-10-CM

## 2019-12-18 LAB
STRESS ANGINA INDEX: 0
STRESS BASELINE DIAS BP: 80 MMHG
STRESS BASELINE HR: 56 BPM
STRESS BASELINE SYS BP: 138 MMHG
STRESS ESTIMATED WORKLOAD: 7.9 METS
STRESS EXERCISE DUR MIN: NORMAL
STRESS PEAK DIAS BP: 70 MMHG
STRESS PEAK SYS BP: 180 MMHG
STRESS PERCENT HR ACHIEVED: 90 %
STRESS POST PEAK HR: 127 BPM
STRESS RATE PRESSURE PRODUCT: NORMAL BPM*MMHG
STRESS TARGET HR: 141 BPM

## 2019-12-18 PROCEDURE — 74011250636 HC RX REV CODE- 250/636: Performed by: INTERNAL MEDICINE

## 2019-12-18 PROCEDURE — 93017 CV STRESS TEST TRACING ONLY: CPT

## 2019-12-18 RX ORDER — SODIUM CHLORIDE 9 MG/ML
250 INJECTION, SOLUTION INTRAVENOUS ONCE
Status: COMPLETED | OUTPATIENT
Start: 2019-12-18 | End: 2019-12-18

## 2019-12-18 RX ADMIN — SODIUM CHLORIDE 250 ML: 900 INJECTION, SOLUTION INTRAVENOUS at 10:50

## 2019-12-19 NOTE — PROGRESS NOTES
Per your last note \" He has been doing well. He has had no symptoms to indicate angina or cardiac decompensation. His blood pressure has been under control. His cholesterol profile has been satisfactory. At this point he will have a follow up stress nuclear cardiac imaging.

## 2020-01-16 RX ORDER — SIMVASTATIN 80 MG/1
TABLET, FILM COATED ORAL
Qty: 90 TAB | Refills: 0 | Status: SHIPPED | OUTPATIENT
Start: 2020-01-16 | End: 2020-04-06

## 2020-01-23 ENCOUNTER — TELEPHONE (OUTPATIENT)
Dept: CARDIOLOGY CLINIC | Age: 80
End: 2020-01-23

## 2020-04-06 RX ORDER — METOPROLOL SUCCINATE 25 MG/1
TABLET, EXTENDED RELEASE ORAL
Qty: 90 TAB | Refills: 3 | Status: SHIPPED | OUTPATIENT
Start: 2020-04-06 | End: 2021-03-24 | Stop reason: SDUPTHER

## 2020-04-06 RX ORDER — SIMVASTATIN 80 MG/1
TABLET, FILM COATED ORAL
Qty: 90 TAB | Refills: 3 | Status: SHIPPED | OUTPATIENT
Start: 2020-04-06 | End: 2021-04-08

## 2020-05-18 ENCOUNTER — VIRTUAL VISIT (OUTPATIENT)
Dept: CARDIOLOGY CLINIC | Age: 80
End: 2020-05-18

## 2020-05-18 DIAGNOSIS — E78.00 HYPERCHOLESTEREMIA: ICD-10-CM

## 2020-05-18 DIAGNOSIS — I25.10 CORONARY ARTERY DISEASE INVOLVING NATIVE CORONARY ARTERY OF NATIVE HEART WITHOUT ANGINA PECTORIS: Primary | ICD-10-CM

## 2020-05-18 NOTE — PROGRESS NOTES
Mary May  78 y.o. who was seen by synchronous (real-time) audio-video technology on 3/31/2020. Consent:  She and/or her healthcare decision maker is aware that this patient-initiated Telehealth encounter is a billable service, with coverage as determined by her insurance carrier. She is aware that she may receive a bill and has provided verbal consent to proceed: YES    I was at home while conducting this encounter. HPI:  He has been doing very well. He has had no chest pain, dyspnea, orthopnea, PND. He denies palpitations, dizziness or syncope. He has had no symptoms to indicate TIA or amaurosis fugax. His blood pressure has been under control. His cholesterol profile has been very satisfactory. He underwent follow up stress nuclear cardiac imaging in December 2019 which demonstrated large scarring in the basal inferior wall, inferior lateral wall and inferior septum with no ischemia although EF was estimated at 44%. He is a nonsmoker. He has history of coronary artery disease and had stent placement in January of 2004 in [place] at [facility]. 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 He has history of hypertension and hypercholesterolemia. He has a family history of prostate cancer. He denies diabetes mellitus. Coding Help - Use CPT Codes 58326-17598, 27619-15014 for Established and New Patients respectively, either employing EM elements or Time rules. Other codes (example consult codes) may also apply. I spent at least minutes 25 with this established patient, and >50% of the time was spent counseling and/or coordinating care regarding current and future cardiac care.   712    Current Outpatient Medications   Medication Sig    metoprolol succinate (TOPROL-XL) 25 mg XL tablet TAKE 1 TABLET BY MOUTH EVERY DAY    simvastatin (ZOCOR) 80 mg tablet TAKE 1 TABLET BY MOUTH EVERY NIGHT    fluticasone (FLONASE ALLERGY RELIEF) 50 mcg/actuation nasal spray 2 Sprays by Both Nostrils route daily.  timolol (TIMOPTIC-XE) 0.5 % ophthalmic gel-forming INSTILL 1 DROP IN BOTH EYES DAILY    brinzolamide-brimonidine (SIMBRINZA) 1-0.2 % drps Apply  to eye.  BIMATOPROST (LUMIGAN OP) Instill  in eye.  therapeutic multivitamin (THERAGRAN) tablet Take 1 Tab by Mouth Once a Day.  multivitamin (ONE A DAY) tablet Take 1 Tab by mouth daily.  aspirin delayed-release 81 mg tablet Take  by mouth daily.  omega-3 fatty acids-vitamin e (FISH OIL) 1,000 mg Cap Take 1 Cap by mouth. No current facility-administered medications for this visit. No Known Allergies       Review of Systems   Constitutional: Negative for malaise/fatigue and weight loss. HENT: Negative for hearing loss. Eyes: Negative for blurred vision and double vision. Respiratory: Negative for shortness of breath. Cardiovascular: Negative for chest pain, palpitations, orthopnea, claudication, leg swelling and PND. Gastrointestinal: Negative for blood in stool, heartburn and melena. Genitourinary: Positive for frequency. Negative for dysuria, hematuria and urgency. Musculoskeletal: Negative for back pain and joint pain. Skin: Negative for itching and rash. Neurological: Negative for dizziness and loss of consciousness. Psychiatric/Behavioral: Negative for depression and memory loss. Vital Signs: (As obtained by patient/caregiver at home)  There were no vitals taken for this visit. Assessment & Plan:       ICD-10-CM ICD-9-CM    1. Coronary artery disease involving native coronary artery of native heart without angina pectoris,s/p stent RCA following inf. MI 2004, EF 44% I25.10 414.01    2. Hypercholesteremia E78.00 272.0            DISCUSSION:  He has been doing very well from a cardiac standpoint. He has had no symptoms to indicate angina or cardiac decompensation.  His blood pressure has been under control and his cholesterol profile has been satisfactory. He had a stress nuclear cardiac imaging in December 2019 which demonstrated scarring in the inferior and inferior lateral wall as well as inferior septum with no evidence of ischemia. His EF was in the 44% range. For now, he will be continued on his current medical regimen. His future cardiac care will be carried over to Dr. Ranjeet Harper starting in June 2020 upon my detention. We discussed the expected course, resolution and complications of the diagnosis(es) in detail. Medication risks, benefits, costs, interactions, and alternatives were discussed as indicated. I advised her to contact the office if her condition worsens, changes or fails to improve as anticipated. She expressed understanding with the diagnosis(es) and plan. Pursuant to the emergency declaration under the [facility] waiver authority and the [employer] General Act, this Virtual  Visit was conducted, with patient's consent, to reduce the patient's risk of exposure to COVID-19 and provide continuity of care for an established patient. Services were provided through a video synchronous discussion virtually to substitute for in-person clinic visit.     Sina Pavon MD

## 2020-11-10 ENCOUNTER — OFFICE VISIT (OUTPATIENT)
Dept: CARDIOLOGY CLINIC | Age: 80
End: 2020-11-10
Payer: COMMERCIAL

## 2020-11-10 VITALS
HEIGHT: 74 IN | BODY MASS INDEX: 27.98 KG/M2 | OXYGEN SATURATION: 97 % | HEART RATE: 58 BPM | WEIGHT: 218 LBS | DIASTOLIC BLOOD PRESSURE: 64 MMHG | SYSTOLIC BLOOD PRESSURE: 120 MMHG

## 2020-11-10 DIAGNOSIS — E78.00 HYPERCHOLESTEREMIA: Primary | ICD-10-CM

## 2020-11-10 DIAGNOSIS — I25.10 CORONARY ARTERY DISEASE INVOLVING NATIVE CORONARY ARTERY OF NATIVE HEART WITHOUT ANGINA PECTORIS: ICD-10-CM

## 2020-11-10 PROCEDURE — 93000 ELECTROCARDIOGRAM COMPLETE: CPT | Performed by: INTERNAL MEDICINE

## 2020-11-10 PROCEDURE — 99214 OFFICE O/P EST MOD 30 MIN: CPT | Performed by: INTERNAL MEDICINE

## 2020-11-10 NOTE — PROGRESS NOTES
Chiquita Patel presents today for   Chief Complaint   Patient presents with    Follow-up     6 month follow up        Chiquita Patel preferred language for health care discussion is english/other. Is someone accompanying this pt? no    Is the patient using any DME equipment during [address]? no    Depression Screening:  3 most recent PHQ Screens 11/10/2020   Little interest or pleasure in doing things Not at all   Feeling down, depressed, irritable, or hopeless Not at all   Total Score PHQ 2 0       Learning Assessment:  Learning Assessment 7/30/2015   PRIMARY LEARNER Patient   BARRIERS PRIMARY LEARNER [address] CAREGIVER No   PRIMARY LANGUAGE ENGLISH   LEARNER PREFERENCE PRIMARY LISTENING   ANSWERED BY patient   RELATIONSHIP SELF       Abuse Screening:  Abuse Screening Questionnaire 11/10/2020   Do you ever feel afraid of your partner? N   Are you in a relationship with someone who physically or mentally threatens you? N   Is it safe for you to go home? Y       Fall Risk  Fall Risk Assessment, last 12 mths 11/10/2020   Able to walk? Yes   Fall in past 12 months? No       Pt currently taking Anticoagulant therapy? ASA 81mg every day     Coordination of Care:  1. Have you been to the ER, urgent care clinic since your last visit? Hospitalized since your last visit? no    2. Have you seen or consulted any other health care providers outside of the [address] [place] [address] since your last visit? Include any pap smears or colon screening.  no

## 2020-11-10 NOTE — PROGRESS NOTES
HISTORY OF PRESENT ILLNESS  Beny Sandhu is a [de-identified] y.o. male. ASSESSMENT and PLAN    Mr. Anival Jones has history of CAD. Back in January 2004, he had a myocardial infarction in [facility], when he presented with nausea and no chest pain. He had inferior wall MI and had a single stent placed; he does not know specific artery, presumably RCA or circumflex. His nuclear scan showed inferior wall scar. His nuclear scan in December 2019 showed EF of 44% with fixed inferobasal and inferolateral defect. No significant reversibility was noted. He has history of dyslipidemia and hypertension. He denies significant tobacco use. He denies family history of CAD. From cardiac standpoint, he is doing well. He continues to walk about 30 minutes daily. His blood pressure is well controlled. He has asymptomatic sinus bradycardia. His weight today is 218 pounds. There is no evidence of decompensated CHF noted. His target LDL is less than 70. He remains on Zocor 80 mg daily. He remains on baby aspirin daily. I would continue his current medication regimen. I will see him back in 6 months. He will likely require surveillance nuclear scan every 1-2 years without classic anginal warning signs. Encounter Diagnoses   Name Primary?  Hypercholesteremia Yes    Coronary artery disease involving native coronary artery of native heart without angina pectoris,s/p stent RCA following inf. MI 2004, EF 44%      current treatment plan is effective, no change in therapy  lab results and schedule of future lab studies reviewed with patient  reviewed diet, exercise and weight control  cardiovascular risk and specific lipid/LDL goals reviewed  use of aspirin to prevent MI and TIA's discussed      HPI   Today, Mr. Kenya Gill has no complaints of chest pains, nausea, increased dyspnea on exertion, or decreased exercise capacity. He denies any orthopnea or PND. Denies any palpitations or dizziness.   He remains active by walking about 30 minutes daily. He has not noted any changes in his walking pace or capacity. Review of Systems   Respiratory: Negative for shortness of breath. Cardiovascular: Negative for chest pain, palpitations, orthopnea, claudication, leg swelling and PND. All other systems reviewed and are negative. Physical Exam  Vitals signs and nursing note reviewed. Constitutional:       Appearance: Normal appearance. HENT:      Head: Normocephalic. Eyes:      Conjunctiva/sclera: Conjunctivae normal.   Neck:      Musculoskeletal: No neck rigidity. Cardiovascular:      Rate and Rhythm: Normal rate and regular rhythm. Pulmonary:      Breath sounds: Normal breath sounds. Abdominal:      Palpations: Abdomen is soft. Musculoskeletal:         General: No swelling. Skin:     General: Skin is warm and dry. Neurological:      General: No focal deficit present. Mental Status: He is alert and oriented to person, place, and time.    Psychiatric:         Mood and Affect: Mood normal.         Behavior: Behavior normal.         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 and other lower urinary tract symptoms (LUTS)        Past Surgical History:   Procedure Laterality Date    CARDIAC SURG PROCEDURE UNLIST      coronary artery stent placement    HX HERNIA REPAIR      right inguinal herniorrhapy    [place] PROSTATE BIOPSY, NEEDLE, SATURATION SAMPLING         Current Outpatient Medications   Medication Sig Dispense Refill    metoprolol succinate (TOPROL-XL) 25 mg XL tablet TAKE 1 TABLET BY MOUTH EVERY DAY 90 Tab 3    simvastatin (ZOCOR) 80 mg tablet TAKE 1 TABLET BY MOUTH EVERY NIGHT 90 Tab 3    fluticasone (FLONASE ALLERGY RELIEF) 50 mcg/actuation nasal spray 2 Sprays by Both Nostrils route daily.  timolol (TIMOPTIC-XE) 0.5 % ophthalmic gel-forming INSTILL 1 DROP IN BOTH EYES DAILY  0    brinzolamide-brimonidine (SIMBRINZA) 1-0.2 % drps Apply  to eye.  BIMATOPROST (LUMIGAN OP) Instill  in eye.  therapeutic multivitamin (THERAGRAN) tablet Take 1 Tab by Mouth Once a Day.  multivitamin (ONE A DAY) tablet Take 1 Tab by mouth daily.  aspirin delayed-release 81 mg tablet Take  by mouth daily.  omega-3 fatty acids-vitamin e (FISH OIL) 1,000 mg Cap Take 1 Cap by mouth.          The patient has a family history of    Social History     Tobacco Use    Smoking status: Former Smoker     Years: 2.00     Last attempt to quit: 1961     Years since quittin.4    Smokeless tobacco: Never Used   Substance Use Topics    Alcohol use: Yes     Comment: socially    Drug use: Not on file       Lab Results   Component Value Date/Time    Cholesterol, total 140 10/09/2019 08:58 AM    HDL Cholesterol 51 10/09/2019 08:58 AM    LDL, calculated 72.4 10/09/2019 08:58 AM    Triglyceride 83 10/09/2019 08:58 AM    CHOL/HDL Ratio 2.7 10/09/2019 08:58 AM        BP Readings from Last 3 Encounters:   11/10/20 120/64   19 138/80   10/15/19 130/76        Pulse Readings from Last 3 Encounters:   11/10/20 (!) 58   10/15/19 (!) 54   19 61       Wt Readings from Last 3 Encounters:   11/10/20 98.9 kg (218 lb)   19 100.7 kg (222 lb)   10/15/19 100.7 kg (222 lb)         EKG: unchanged from previous tracings, normal sinus rhythm, Q waves in lead III and aVF as well as T wave inversions noted inferolaterally in leads II, 3, aVF, V4 through V6.

## 2020-11-13 ENCOUNTER — HOSPITAL ENCOUNTER (OUTPATIENT)
Dept: LAB | Age: 80
Discharge: HOME | End: 2020-11-13
Payer: COMMERCIAL

## 2020-11-13 DIAGNOSIS — E78.00 HYPERCHOLESTEREMIA: ICD-10-CM

## 2020-11-13 LAB
CHOLEST SERPL-MCNC: 153 MG/DL
HDLC SERPL-MCNC: 56 MG/DL (ref 40–60)
HDLC SERPL: 2.7 {RATIO} (ref 0–5)
LDLC SERPL CALC-MCNC: 82.4 MG/DL (ref 0–100)
LIPID PROFILE,FLP: NORMAL
TRIGL SERPL-MCNC: 73 MG/DL (ref ?–150)
VLDLC SERPL CALC-MCNC: 14.6 MG/DL

## 2020-11-13 PROCEDURE — 80061 LIPID PANEL: CPT

## 2020-11-13 PROCEDURE — 36415 COLL VENOUS BLD VENIPUNCTURE: CPT

## 2020-11-13 NOTE — PROGRESS NOTES
Per your last note\"    Mr. Stacie Coleman has history of CAD. Back in January 2004, he had a myocardial infarction in [facility], when he presented with nausea and no chest pain. He had inferior wall MI and had a single stent placed; he does not know specific artery, presumably RCA or circumflex. His nuclear scan showed inferior wall scar. His nuclear scan in December 2019 showed EF of 44% with fixed inferobasal and inferolateral defect. No significant reversibility was noted. He has history of dyslipidemia and hypertension. He denies significant tobacco use. He denies family history of CAD. From cardiac standpoint, he is doing well. He continues to walk about 30 minutes daily. His blood pressure is well controlled. He has asymptomatic sinus bradycardia. His weight today is 218 pounds. There is no evidence of decompensated CHF noted. His target LDL is less than 70. He remains on Zocor 80 mg daily. He remains on baby aspirin daily. I would continue his current medication regimen. I will see him back in 6 months.   He will likely require surveillance nuclear scan every 1-2 years without classic anginal warning signs.

## 2020-11-18 ENCOUNTER — TELEPHONE (OUTPATIENT)
Dept: CARDIOLOGY CLINIC | Age: 80
End: 2020-11-18

## 2020-11-18 NOTE — TELEPHONE ENCOUNTER
----- Message from [id number] Maciel Medrano MD sent at 11/17/2020  4:47 PM EST -----  His cholesterol panel results are acceptable to me. Please forward the findings to the patient.  ----- Message -----  From: Jolie ADRIANNA Redmond  Sent: [id number]   2:12 PM EST  To: 2022 Maciel Medrano MD    Per your last note\"    Mr. Vamshi Lemus has history of CAD. Back in January 2004, he had a myocardial infarction in [facility], when he presented with nausea and no chest pain. He had inferior wall MI and had a single stent placed; he does not know specific artery, presumably RCA or circumflex. His nuclear scan showed inferior wall scar. His nuclear scan in December 2019 showed EF of 44% with fixed inferobasal and inferolateral defect. No significant reversibility was noted. He has history of dyslipidemia and hypertension. He denies significant tobacco use. He denies family history of CAD. From cardiac standpoint, he is doing well. He continues to walk about 30 minutes daily. His blood pressure is well controlled. He has asymptomatic sinus bradycardia. His weight today is 218 pounds. There is no evidence of decompensated CHF noted. His target LDL is less than 70. He remains on Zocor 80 mg daily. He remains on baby aspirin daily. I would continue his current medication regimen. I will see him back in 6 months.   He will likely require surveillance nuclear scan every 1-2 years without classic anginal warning signs.

## 2021-03-24 DIAGNOSIS — I25.10 CORONARY ARTERY DISEASE INVOLVING NATIVE CORONARY ARTERY OF NATIVE HEART WITHOUT ANGINA PECTORIS: Primary | ICD-10-CM

## 2021-03-24 RX ORDER — METOPROLOL SUCCINATE 25 MG/1
TABLET, EXTENDED RELEASE ORAL
Qty: 90 TAB | Refills: 3 | Status: SHIPPED | OUTPATIENT
Start: 2021-03-24 | End: 2022-03-14

## 2021-04-08 RX ORDER — SIMVASTATIN 80 MG/1
TABLET, FILM COATED ORAL
Qty: 90 TAB | Refills: 3 | Status: SHIPPED | OUTPATIENT
Start: 2021-04-08 | End: 2022-03-14

## 2021-05-11 ENCOUNTER — OFFICE VISIT (OUTPATIENT)
Dept: CARDIOLOGY CLINIC | Age: 81
End: 2021-05-11
Payer: COMMERCIAL

## 2021-05-11 VITALS
HEART RATE: 56 BPM | WEIGHT: 222 LBS | HEIGHT: 74 IN | OXYGEN SATURATION: 99 % | DIASTOLIC BLOOD PRESSURE: 78 MMHG | SYSTOLIC BLOOD PRESSURE: 132 MMHG | BODY MASS INDEX: 28.49 KG/M2

## 2021-05-11 DIAGNOSIS — I25.10 CORONARY ARTERY DISEASE INVOLVING NATIVE CORONARY ARTERY OF NATIVE HEART WITHOUT ANGINA PECTORIS: ICD-10-CM

## 2021-05-11 DIAGNOSIS — I25.10 CORONARY ARTERY DISEASE INVOLVING NATIVE CORONARY ARTERY OF NATIVE HEART WITHOUT ANGINA PECTORIS: Primary | ICD-10-CM

## 2021-05-11 DIAGNOSIS — R07.9 CHEST PAIN, UNSPECIFIED TYPE: ICD-10-CM

## 2021-05-11 PROCEDURE — 99214 OFFICE O/P EST MOD 30 MIN: CPT | Performed by: INTERNAL MEDICINE

## 2021-05-11 PROCEDURE — 93000 ELECTROCARDIOGRAM COMPLETE: CPT | Performed by: INTERNAL MEDICINE

## 2021-05-11 NOTE — PROGRESS NOTES
HISTORY OF PRESENT ILLNESS  Pati Dupree is a [de-identified] y.o. male. ASSESSMENT and PLAN    Mr. Andre German has history of CAD. Back in January 2004, he had a myocardial infarction in [facility], when he presented with nausea and no chest pain. 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 His nuclear scan showed inferior wall scar. His nuclear scan in December 2019 showed EF of 44% with fixed inferobasal and inferolateral defect. No significant reversibility was noted. He has history of dyslipidemia and hypertension. He denies significant tobacco use. He denies family history of CAD.  CAD:    He has continued worsening SOFIA. We will check repeat nuclear scan since it has been about 18 months since his last study. His symptoms have worsened. Because of lack of typical angina pain, I have a lower threshold to proceed with repeat nuclear scan.  BP:   Well controlled.  Rhythm:    Asymptomatic sinus bradycardia.  CHF:   Currently, there is no evidence of decompensated CHF noted.  Weight:    His weight today is 222 pounds. His baseline weight is 218 pounds. I did ask him not to gain any further weight.  Cholesterol:  Target LDL<70. Zocor 80.  Tobacco:    He denies active tobacco use.  Anticoagulant:  Remains on ASA. He will likely require surveillance nuclear scan every 1-2 years without classic anginal warning signs. If the nuclear scan is unremarkable, I will see him back in 6 months. Thank you. Encounter Diagnoses   Name Primary?     Coronary artery disease involving native coronary artery of native heart without angina pectoris,s/p stent RCA following inf. MI 2004, EF 44% Yes    Chest pain, unspecified type      current treatment plan is effective, no change in therapy  lab results and schedule of future lab studies reviewed with patient  reviewed diet, exercise and weight control  cardiovascular risk and specific lipid/LDL goals reviewed  use of aspirin to prevent MI and TIA's discussed      HPI   Today, Mr. Arnel Story has no complaints of chest pains. Unfortunately, he has never had chest pains as typical warning symptom. He has been having worsening dyspnea on exertion especially when he does physical activities. He denies any shortness of breath at rest.  He denies orthopnea or PND. He denies palpitations or dizziness. Review of Systems   Respiratory: Positive for shortness of breath. Cardiovascular: Negative for chest pain, palpitations, orthopnea, claudication, leg swelling and PND. All other systems reviewed and are negative. Physical Exam  Vitals signs and nursing note reviewed. Constitutional:       Appearance: Normal appearance. HENT:      Head: Normocephalic. Eyes:      Conjunctiva/sclera: Conjunctivae normal.   Neck:      Musculoskeletal: No neck rigidity. Vascular: No carotid bruit. Cardiovascular:      Rate and Rhythm: Normal rate and regular rhythm. Pulmonary:      Breath sounds: Normal breath sounds. Abdominal:      Palpations: Abdomen is soft. Musculoskeletal:         General: No swelling. Skin:     General: Skin is warm and dry. Neurological:      General: No focal deficit present. Mental Status: He is alert and oriented to person, place, and time.    Psychiatric:         Mood and Affect: Mood normal.         Behavior: Behavior normal.         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 and other lower urinary tract symptoms (LUTS)        Past Surgical History:   Procedure Laterality Date    CARDIAC SURG PROCEDURE UNLIST      coronary artery stent placement    HX HERNIA REPAIR      right inguinal herniorrhapy    [place] PROSTATE BIOPSY, NEEDLE, SATURATION SAMPLING         Current Outpatient Medications   Medication Sig Dispense Refill    simvastatin (ZOCOR) 80 mg tablet TAKE 1 TABLET BY MOUTH EVERY NIGHT 90 Tab 3    metoprolol succinate (TOPROL-XL) 25 mg XL tablet TAKE 1 TABLET BY MOUTH EVERY DAY 90 Tab 3    fluticasone (FLONASE ALLERGY RELIEF) 50 mcg/actuation nasal spray 2 Sprays by Both Nostrils route daily.  timolol (TIMOPTIC-XE) 0.5 % ophthalmic gel-forming INSTILL 1 DROP IN BOTH EYES DAILY  0    brinzolamide-brimonidine (SIMBRINZA) 1-0.2 % drps Apply  to eye.  BIMATOPROST (LUMIGAN OP) Instill  in eye.  therapeutic multivitamin (THERAGRAN) tablet Take 1 Tab by Mouth Once a Day.  multivitamin (ONE A DAY) tablet Take 1 Tab by mouth daily.  aspirin delayed-release 81 mg tablet Take  by mouth daily.  omega-3 fatty acids-vitamin e (FISH OIL) 1,000 mg Cap Take 1 Cap by mouth.          The patient has a family history of    Social History     Tobacco Use    Smoking status: Former Smoker     Years: 2.00     Quit date: 1961     Years since quittin.9    Smokeless tobacco: Never Used   Substance Use Topics    Alcohol use: Yes     Comment: socially    Drug use: Not on file       Lab Results   Component Value Date/Time    Cholesterol, total 153 2020 11:09 AM    HDL Cholesterol 56 2020 11:09 AM    LDL, calculated 82.4 2020 11:09 AM    Triglyceride 73 2020 11:09 AM    CHOL/HDL Ratio 2.7 2020 11:09 AM        BP Readings from Last 3 Encounters:   21 132/78   11/10/20 120/64   19 138/80        Pulse Readings from Last 3 Encounters:   21 (!) 56   11/10/20 (!) 58   10/15/19 (!) 54       Wt Readings from Last 3 Encounters: 05/11/21 100.7 kg (222 lb)   11/10/20 98.9 kg (218 lb)   12/18/19 100.7 kg (222 lb)         EKG: unchanged from previous tracings, sinus bradycardia, Q waves in leads II, III, and aVF as well as T wave inversions noted in inferolateral leads.

## 2021-05-11 NOTE — PROGRESS NOTES
Rossana Hirsch presents today for   Chief Complaint   Patient presents with    Follow-up     6 month f/u       Rossana Hirsch preferred language for health care discussion is english/other. Is someone accompanying this pt? no    Is the patient using any DME equipment during [address]? no    Depression Screening:  3 most recent PHQ Screens 5/11/2021   Little interest or pleasure in doing things Not at all   Feeling down, depressed, irritable, or hopeless Not at all   Total Score PHQ 2 0       Learning Assessment:  Learning Assessment 7/30/2015   PRIMARY LEARNER Patient   BARRIERS PRIMARY LEARNER [address] CAREGIVER No   PRIMARY LANGUAGE ENGLISH   LEARNER PREFERENCE PRIMARY LISTENING   ANSWERED BY patient   RELATIONSHIP SELF       Abuse Screening:  Abuse Screening Questionnaire 5/11/2021   Do you ever feel afraid of your partner? N   Are you in a relationship with someone who physically or mentally threatens you? N   Is it safe for you to go home? Y       Fall Risk  Fall Risk Assessment, last 12 mths 5/11/2021   Able to walk? Yes   Fall in past 12 months? 0   Do you feel unsteady? 0   Are you worried about falling 0       Pt currently taking Anticoagulant therapy? ASA 81mg    Coordination of Care:  1. Have you been to the ER, urgent care clinic since your last visit? Hospitalized since your last visit? No    2. Have you seen or consulted any other health care providers outside of the [address] [place] [address] since your last visit? Include any pap smears or colon screening.  no

## 2021-05-19 ENCOUNTER — TELEPHONE (OUTPATIENT)
Dept: CARDIOLOGY CLINIC | Age: 81
End: 2021-05-19

## 2021-05-19 NOTE — TELEPHONE ENCOUNTER
Called patient to try to schedule nuclear stress test. pt does not want to schedule nuclear stress test yet due to cost.

## 2021-11-09 ENCOUNTER — OFFICE VISIT (OUTPATIENT)
Dept: CARDIOLOGY CLINIC | Age: 81
End: 2021-11-09
Payer: COMMERCIAL

## 2021-11-09 VITALS
HEIGHT: 74 IN | HEART RATE: 62 BPM | WEIGHT: 223 LBS | DIASTOLIC BLOOD PRESSURE: 82 MMHG | BODY MASS INDEX: 28.62 KG/M2 | SYSTOLIC BLOOD PRESSURE: 130 MMHG | OXYGEN SATURATION: 98 %

## 2021-11-09 DIAGNOSIS — I25.10 CORONARY ARTERY DISEASE INVOLVING NATIVE CORONARY ARTERY OF NATIVE HEART WITHOUT ANGINA PECTORIS: Primary | ICD-10-CM

## 2021-11-09 PROCEDURE — 99214 OFFICE O/P EST MOD 30 MIN: CPT | Performed by: INTERNAL MEDICINE

## 2021-11-09 PROCEDURE — 93000 ELECTROCARDIOGRAM COMPLETE: CPT | Performed by: INTERNAL MEDICINE

## 2021-11-09 RX ORDER — KETOROLAC TROMETHAMINE 5 MG/ML
1 SOLUTION OPHTHALMIC
COMMUNITY
Start: 2021-09-19

## 2021-11-09 NOTE — PROGRESS NOTES
HISTORY OF PRESENT ILLNESS  Natalie Jurado is a 80 y.o. male. ASSESSMENT and PLAN    Mr. Jackie Widsom has history of CAD.  Back in January 2004, he had a myocardial infarction in [facility], when he presented with nausea and no chest pain.  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  His nuclear scan showed inferior wall scar. His nuclear scan in December 2019 showed EF of 44% with fixed inferobasal and inferolateral defect.  No significant reversibility was noted. He has history of dyslipidemia and hypertension. Thecole Banegas denies significant tobacco use.  He denies family history of CAD. · CAD:    Clinically stable. Unfortunately, he did not undergo the nuclear scan that was requested earlier this year. He is reluctant if his co-pay is substantial.  · BP:    Well controlled. · Rhythm:    Stable sinus. · CHF:    There is no evidence of decompensated CHF noted. · Weight:     His weight today is 223 pounds. His baseline weight is 220 pounds. · Cholesterol:   Target LDL <70. Zocor 80. · Tobacco:    He denies active tobacco use. · Anti-platelet:   Remains on ASA. As noted previously, he will likely require surveillance nuclear scan every 1-2 years without classic anginal warning signs.   If the nuclear scan is unremarkable, I will see him back in 6 months.    Thank you. Encounter Diagnoses   Name Primary?  Coronary artery disease involving native coronary artery of native heart without angina pectoris Yes     current treatment plan is effective, no change in therapy  lab results and schedule of future lab studies reviewed with patient  reviewed diet, exercise and weight control  cardiovascular risk and specific lipid/LDL goals reviewed  use of aspirin to prevent MI and TIA's discussed      HPI   Today, Mr. Tomas Geronimo has no complaints of chest pains. He denies any complaints of nausea.   He denies any changes in his breathing status or exercise capacity. He denies any orthopnea or PND. He denies any palpitations or dizziness. Unfortunately, he did not undergo the nuclear scan that was requested last year. This is mainly due to the fact that his co-pay would have been around $300. His office co-pay is around $35.    Review of Systems   Respiratory: Negative for shortness of breath. Cardiovascular: Negative for chest pain, palpitations, orthopnea, claudication, leg swelling and PND. Gastrointestinal: Negative for nausea. All other systems reviewed and are negative. Physical Exam  Vitals and nursing note reviewed. Constitutional:       Appearance: Normal appearance. HENT:      Head: Normocephalic. Eyes:      Conjunctiva/sclera: Conjunctivae normal.   Cardiovascular:      Rate and Rhythm: Normal rate and regular rhythm. Pulmonary:      Breath sounds: Normal breath sounds. Abdominal:      Palpations: Abdomen is soft. Musculoskeletal:         General: No swelling. Cervical back: No rigidity. Skin:     General: Skin is warm and dry. Neurological:      General: No focal deficit present. Mental Status: He is oriented to person, place, and time.    Psychiatric:         Mood and Affect: Mood normal.         Behavior: Behavior normal.         PCP: Melany Akbar PA-C    Past Medical History:   Diagnosis Date    Atherosclerotic coronary vascular disease     Calculus of kidney     Calculus of ureter     Family history of prostate cancer     Glaucoma     History of radiation therapy     external beam radiation for prostate cancer    Hypercholesterolemia     Hypertension     Malignant neoplasm of prostate ([facility] [address] 6/14/2011    MI (myocardial infarction) ([facility] [address]     Unspecified hyperplasia of prostate with urinary obstruction and other lower urinary tract symptoms (LUTS)        Past Surgical History:   Procedure Laterality Date    CARDIAC SURG PROCEDURE UNLIST      coronary artery stent placement    HX HERNIA REPAIR      right inguinal herniorrhapy    [place] PROSTATE BIOPSY, NEEDLE, SATURATION SAMPLING         Current Outpatient Medications   Medication Sig Dispense Refill    ketorolac (ACULAR) 0.5 % ophthalmic solution INSTILL 1 DROP IN LEFT EYE TWICE DAILY AS DIRECTED      simvastatin (ZOCOR) 80 mg tablet TAKE 1 TABLET BY MOUTH EVERY NIGHT 90 Tab 3    metoprolol succinate (TOPROL-XL) 25 mg XL tablet TAKE 1 TABLET BY MOUTH EVERY DAY 90 Tab 3    fluticasone (FLONASE ALLERGY RELIEF) 50 mcg/actuation nasal spray 2 Sprays by Both Nostrils route daily.  timolol (TIMOPTIC-XE) 0.5 % ophthalmic gel-forming INSTILL 1 DROP IN BOTH EYES DAILY  0    brinzolamide-brimonidine (SIMBRINZA) 1-0.2 % drps Apply  to eye.  BIMATOPROST (LUMIGAN OP) Instill  in eye.  therapeutic multivitamin (THERAGRAN) tablet Take 1 Tab by Mouth Once a Day.  multivitamin (ONE A DAY) tablet Take 1 Tab by mouth daily.  aspirin delayed-release 81 mg tablet Take  by mouth daily.  omega-3 fatty acids-vitamin e (FISH OIL) 1,000 mg Cap Take 1 Cap by mouth.          The patient has a family history of    Social History     Tobacco Use    Smoking status: Former Smoker     Years: 2.00     Quit date: 1961     Years since quittin.4    Smokeless tobacco: Never Used   Substance Use Topics    Alcohol use: Yes     Comment: socially    Drug use: Not on file       Lab Results   Component Value Date/Time    Cholesterol, total 153 2020 11:09 AM    HDL Cholesterol 56 2020 11:09 AM    LDL, calculated 82.4 2020 11:09 AM    Triglyceride 73 2020 11:09 AM    CHOL/HDL Ratio 2.7 2020 11:09 AM        BP Readings from Last 3 Encounters:   21 130/82   21 132/78   11/10/20 120/64        Pulse Readings from Last 3 Encounters:   21 62   21 (!) 56   11/10/20 (!) 58       Wt Readings from Last 3 Encounters:   21 101.2 kg (223 lb)   21 100.7 kg (222 lb) 11/10/20 98.9 kg (218 lb)         EKG: unchanged from previous tracings, normal sinus rhythm, nonspecific ST and T waves changes, inferolateral ST depression and T wave inversion with small Q waves noted in leads III and aVF.

## 2021-11-09 NOTE — PROGRESS NOTES
Erasmo Shell presents today for   Chief Complaint   Patient presents with   [address] preferred language for health care discussion is english/other. Is someone accompanying this pt? no    Is the patient using any DME equipment during [address]? no    Depression Screening:  3 most recent PHQ Screens 11/9/2021   Little interest or pleasure in doing things Not at all   Feeling down, depressed, irritable, or hopeless Not at all   Total Score PHQ 2 0       Learning Assessment:  Learning Assessment 7/30/2015   PRIMARY LEARNER Patient   BARRIERS PRIMARY LEARNER [address] CAREGIVER No   PRIMARY LANGUAGE ENGLISH   LEARNER PREFERENCE PRIMARY LISTENING   ANSWERED BY patient   RELATIONSHIP SELF       Abuse Screening:  Abuse Screening Questionnaire 5/11/2021   Do you ever feel afraid of your partner? N   Are you in a relationship with someone who physically or mentally threatens you? N   Is it safe for you to go home? Y       Fall Risk  Fall Risk Assessment, last 12 mths 11/9/2021   Able to walk? Yes   Fall in past 12 months? 0   Do you feel unsteady? 0   Are you worried about falling 0       Pt currently taking Anticoagulant therapy? no    Coordination of Care:  1. Have you been to the ER, urgent care clinic since your last visit? Hospitalized since your last visit? no    2. Have you seen or consulted any other health care providers outside of the [address] [place] [address] since your last visit? Include any pap smears or colon screening.  no

## 2021-11-12 ENCOUNTER — OFFICE VISIT (OUTPATIENT)
Dept: FAMILY MEDICINE CLINIC | Age: 81
End: 2021-11-12
Payer: COMMERCIAL

## 2021-11-12 VITALS
OXYGEN SATURATION: 98 % | HEIGHT: 74 IN | WEIGHT: 217 LBS | HEART RATE: 68 BPM | RESPIRATION RATE: 16 BRPM | TEMPERATURE: 98.1 F | DIASTOLIC BLOOD PRESSURE: 84 MMHG | BODY MASS INDEX: 27.85 KG/M2 | SYSTOLIC BLOOD PRESSURE: 128 MMHG

## 2021-11-12 DIAGNOSIS — M25.562 CHRONIC PAIN OF LEFT KNEE: Primary | ICD-10-CM

## 2021-11-12 DIAGNOSIS — E78.00 HYPERCHOLESTEREMIA: ICD-10-CM

## 2021-11-12 DIAGNOSIS — G89.29 CHRONIC PAIN OF LEFT KNEE: Primary | ICD-10-CM

## 2021-11-12 DIAGNOSIS — I25.10 CORONARY ARTERY DISEASE INVOLVING NATIVE CORONARY ARTERY OF NATIVE HEART WITHOUT ANGINA PECTORIS: ICD-10-CM

## 2021-11-12 PROCEDURE — 99204 OFFICE O/P NEW MOD 45 MIN: CPT | Performed by: STUDENT IN AN ORGANIZED HEALTH CARE EDUCATION/TRAINING PROGRAM

## 2021-11-12 NOTE — PROGRESS NOTES
Chief Complaint   Patient presents with    New Patient    Knee Pain     c/o left knee giving out     Other     has concerns about balance        1. \"Have you been to the ER, urgent care clinic since your last visit? Hospitalized since your last visit? \" No    2. \"Have you seen or consulted any other health care providers outside of the [address] [place] [address] since your last visit? \" No     3. For patients aged 39-70: Has the patient had a colonoscopy? Yes, but HM not satisfied.  Rooming MA/LPN to request most recent results

## 2021-11-12 NOTE — PROGRESS NOTES
Natalie Jurado, 80 y.o.,  male presents  to the office new patient accompanied by his wife  Chief Complaint   Patient presents with    New Patient    Knee Pain     c/o left knee giving out     Other     has concerns about balance        SUBJECTIVE  History of present illness  1. Left knee pain  History of intermittent mild left knee pain. Patient states his left knee is weaker and giving out. States it affects his gait causing instability. Denies knee trauma or injury. Has never been evaluated. 2.  Coronary artery disease. PMH of hyperlipidemia and coronary artery disease. History of stent placement in January 2004. On metoprolol 25 mg,  Zocor 80 mg and aspirin 81 mg. Denies side effect. Follows with cardiology, Dr. Alicia Baez. LOV 11/9/2021  3. Prostate cancer:  Prostate carcinoma [place] 6 histology/PSA 3.4/XRT 2005  Patient has had low-level undulating PSA elevation but the past years. Negative bone scan in 2011. Follows with urology, Dr. Chelsi Phillips. LOV 02/01/2018. ROS:  Pertinent items are noted in HPI    OBJECTIVE    Physical Exam:     Visit Vitals  /84 (BP 1 Location: Right arm, BP Patient Position: Sitting, BP Cuff Size: Large adult)   Pulse 68   Temp 98.1 °F (36.7 °C) (Temporal)   Resp 16   Ht 6' 2\" (1.88 m)   Wt 217 lb (98.4 kg)   SpO2 98%   BMI 27.86 kg/m²       General: alert, well-appearing, in no apparent distress or pain  Head: atraumatic.  Non-tender maxillary and frontal sinuses  Eyes: Lids with no discharge, no matting, conjunctivae clear and non injected, full EOMs, PERLLA  Ears: pinna non-tender, external auditory canal patent, TM intact  Mouth/throat: teeth, gums, palate normal appearing, moist oral mucosa, tonsils non enlarged, pharynx non erythematous and no lesion  Neck: supple, no JVD , no carotid bruit, no adenopathy palpated  CVS: normal rate, regular rhythm, distinct S1 and S2, no murmurs, rubs clicks or gallops  Lungs: Breathing not labored, good chest excursion, clear to ausculation bilaterally, no crackles, wheezing or rhonchi noted  Abdomen: normoactive bowel sounds, soft, non-tender, no organomegaly  Extremities: no edema, no cyanosis,  Musculoskeletal:  Left knee: Inspection of the left knee demonstrates there is no obvious deformity. There is no effusion. There is no evidence of dislocation. Palpation demonstrates there is lateral joint line tenderness. Crepitus over the knee with flexion and extension noted. Full range of motion with flexion and extension. The bilateral knee demonstrates normal muscle tone with 5/5 strength with flexion extension. Neurologic: Romberg negative. Gait normal.  No tremor noted. Skin: warm, no lesions, rashes noted  Psych: alert and oriented x3, mood, insight, speech and affect normal    ASSESSMENT/PLAN  Diagnoses and all orders for this visit:    1. Chronic pain of left knee  -     REFERRAL TO ORTHOPEDICS  -     CBC WITH AUTOMATED DIFF; Future  -     METABOLIC PANEL, COMPREHENSIVE; Future  Labs per order. Referral to Ortho for further evaluation and treatment. 2. History of coronary artery disease, s/p stent Jan. 2004   -     CBC WITH AUTOMATED DIFF; Future  -     METABOLIC PANEL, COMPREHENSIVE; Future  Labs ordered. Patient follows with cardiology, Dr. Jerzy Arrington. LOV 11/9/2021. ROV 5/10/2022. Patient advised to take medications as prescribed by the cardiologist.  3. Hypercholesteremia  -     LIPID PANEL; Future  Low-fat, low-cholesterol, high-fiber diet recommended. Physical activity as tolerable. Take Zocor as prescribed by the cardiologist.      Follow-up and Dispositions    · Return in about 4 months (around 3/12/2022) for annual physical exan or sooner based on lab results.

## 2021-11-12 NOTE — PATIENT INSTRUCTIONS
Joint Pain: Care Instructions  Your Care Instructions     Many people have small aches and pains from overuse or injury to muscles and joints. Joint injuries often happen during sports or recreation, work tasks, or projects around the home. An overuse injury can happen when you put too much stress on a joint or when you do an activity that stresses the joint over and over, such as using the computer or rowing a boat. You can take action at home to help your muscles and joints get better. You should feel better in 1 to 2 weeks, but it can take 3 months or more to heal completely. Follow-up care is a key part of your treatment and safety. Be sure to make and go to all appointments, and call your doctor if you are having problems. It's also a good idea to know your test results and keep a list of the medicines you take. How can you care for yourself at home? · Do not put weight on the injured joint for at least a day or two. · For the first day or two after an injury, do not take hot showers or baths, and do not use hot packs. The heat could make swelling worse. · Put ice or a cold pack on the sore joint for 10 to 20 minutes at a time. Try to do this every 1 to 2 hours for the next 3 days (when you are awake) or until the swelling goes down. Put a thin cloth between the ice and your skin. · Wrap the injury in an elastic bandage. Do not wrap it too tightly because this can cause more swelling. · Prop up the sore joint on a pillow when you ice it or anytime you sit or lie down during the next 3 days. Try to keep it above the level of your heart. This will help reduce swelling. · Take an over-the-counter pain medicine, such as acetaminophen (Tylenol), ibuprofen (Advil, Motrin), or naproxen (Aleve). Read and follow all instructions on the label. · After 1 or 2 days of rest, begin moving the joint gently.  While the joint is still healing, you can begin to exercise using activities that do not strain or hurt the painful joint. When should you call for help? Call your doctor now or seek immediate medical care if:    · You have signs of infection, such as:  ? Increased pain, swelling, warmth, and redness. ? Red streaks leading from the joint. ? A fever. Watch closely for changes in your health, and be sure to contact your doctor if:    · Your movement or symptoms are not getting better after 1 to 2 weeks of home treatment. Where can you learn more? Go to http://www.gray.com/  Enter P205 in the search box to learn more about \"Joint Pain: Care Instructions. \"  Current as of: July 1, 2021               Content Version: 13.0  © 7031-0569 [employer]. Care instructions adapted under license by [employer] (which disclaims liability or warranty for this information). If you have questions about a medical condition or this instruction, always ask your healthcare professional. [place] [address] any warranty or liability for your use of this information. Knee Pain or Injury: Care Instructions  Your Care Instructions     Injuries are a common cause of knee problems. Sudden (acute) injuries may be caused by a direct blow to the knee. They can also be caused by abnormal twisting, bending, or falling on the knee. Pain, bruising, or swelling may be severe, and may start within minutes of the injury. Overuse is another cause of knee pain. Other causes are climbing stairs, kneeling, and other activities that use the knee. Everyday wear and tear, especially as you get older, also can cause knee pain. Rest, along with home treatment, often relieves pain and allows your knee to heal. If you have a serious knee injury, you may need tests and treatment. Follow-up care is a key part of your treatment and safety. Be sure to make and go to all appointments, and call your doctor if you are having problems.  It's also a good idea to know your test results and keep a list of the medicines you take. How can you care for yourself at home? · Be safe with medicines. Read and follow all instructions on the label. ? If the doctor gave you a prescription medicine for pain, take it as prescribed. ? If you are not taking a prescription pain medicine, ask your doctor if you can take an over-the-counter medicine. · Rest and protect your knee. Take a break from any activity that may cause pain. · Put ice or a cold pack on your knee for 10 to 20 minutes at a time. Put a thin cloth between the ice and your skin. · Prop up a sore knee on a pillow when you ice it or anytime you sit or lie down for the next 3 days. Try to keep it above the level of your heart. This will help reduce swelling. · If your knee is not swollen, you can put moist heat, a heating pad, or a warm cloth on your knee. · If your doctor recommends an elastic bandage, sleeve, or other type of support for your knee, wear it as directed. · Follow your doctor's instructions about how much weight you can put on your leg. Use a cane, crutches, or a walker as instructed. · Follow your doctor's instructions about activity during your healing process. If you can do mild exercise, slowly increase your activity. · Reach and stay at a healthy weight. Extra weight can strain the joints, especially the knees and hips, and make the pain worse. Losing even a few pounds may help. When should you call for help? Call 911 anytime you think you may need emergency care. For example, call if:    · You have symptoms of a blood clot in your lung (called a pulmonary embolism). These may include:  ? Sudden chest pain. ? Trouble breathing. ? Coughing up blood.    Call your doctor now or seek immediate medical care if:    · You have severe or increasing pain.     · Your leg or foot turns cold or changes color.     · You cannot stand or put weight on your knee.     · Your knee looks twisted or bent out of shape.     · You cannot move your knee.     · You have signs of infection, such as:  ? Increased pain, swelling, warmth, or redness. ? Red streaks leading from the knee. ? Pus draining from a place on your knee. ? A fever.     · You have signs of a blood clot in your leg (called a deep vein thrombosis), such as:  ? Pain in your calf, back of the knee, thigh, or groin. ? Redness and swelling in your leg or groin. Watch closely for changes in your health, and be sure to contact your doctor if:    · You have tingling, weakness, or numbness in your knee.     · You have any new symptoms, such as swelling.     · You have bruises from a knee injury that last longer than 2 weeks.     · You do not get better as expected. Where can you learn more? Go to http://www.gray.com/  Enter K195 in the search box to learn more about \"Knee Pain or Injury: Care Instructions. \"  Current as of: July 1, 2021               Content Version: 13.0  © 2006-2021 [employer]. Care instructions adapted under license by [employer] (which disclaims liability or warranty for this information). If you have questions about a medical condition or this instruction, always ask your healthcare professional. [place] [address] any warranty or liability for your use of this information. Joint Pain: Care Instructions  Your Care Instructions     Many people have small aches and pains from overuse or injury to muscles and joints. Joint injuries often happen during sports or recreation, work tasks, or projects around the home. An overuse injury can happen when you put too much stress on a joint or when you do an activity that stresses the joint over and over, such as using the computer or rowing a boat. You can take action at home to help your muscles and joints get better. You should feel better in 1 to 2 weeks, but it can take 3 months or more to heal completely.   Follow-up care is a key part of your treatment and safety. Be sure to make and go to all appointments, and call your doctor if you are having problems. It's also a good idea to know your test results and keep a list of the medicines you take. How can you care for yourself at home? · Do not put weight on the injured joint for at least a day or two. · For the first day or two after an injury, do not take hot showers or baths, and do not use hot packs. The heat could make swelling worse. · Put ice or a cold pack on the sore joint for 10 to 20 minutes at a time. Try to do this every 1 to 2 hours for the next 3 days (when you are awake) or until the swelling goes down. Put a thin cloth between the ice and your skin. · Wrap the injury in an elastic bandage. Do not wrap it too tightly because this can cause more swelling. · Prop up the sore joint on a pillow when you ice it or anytime you sit or lie down during the next 3 days. Try to keep it above the level of your heart. This will help reduce swelling. · Take an over-the-counter pain medicine, such as acetaminophen (Tylenol), ibuprofen (Advil, Motrin), or naproxen (Aleve). Read and follow all instructions on the label. · After 1 or 2 days of rest, begin moving the joint gently. While the joint is still healing, you can begin to exercise using activities that do not strain or hurt the painful joint. When should you call for help? Call your doctor now or seek immediate medical care if:    · You have signs of infection, such as:  ? Increased pain, swelling, warmth, and redness. ? Red streaks leading from the joint. ? A fever. Watch closely for changes in your health, and be sure to contact your doctor if:    · Your movement or symptoms are not getting better after 1 to 2 weeks of home treatment. Where can you learn more? Go to http://www.gray.com/  Enter P205 in the search box to learn more about \"Joint Pain: Care Instructions. \"  Current as of: July 1, 2021               Content Version: 13.0  © 3569-2045 [employer]. Care instructions adapted under license by [employer] (which disclaims liability or warranty for this information). If you have questions about a medical condition or this instruction, always ask your healthcare professional. [address] any warranty or liability for your use of this information.

## 2021-11-15 PROBLEM — I20.9 ANGINA PECTORIS (HCC): Status: ACTIVE | Noted: 2018-09-24

## 2021-11-15 PROBLEM — R26.89 BALANCE PROBLEMS: Status: ACTIVE | Noted: 2018-09-24

## 2021-11-15 PROBLEM — I10 ESSENTIAL HYPERTENSION, BENIGN: Status: ACTIVE | Noted: 2018-09-24

## 2021-11-16 ENCOUNTER — HOSPITAL ENCOUNTER (OUTPATIENT)
Dept: LAB | Age: 81
Discharge: HOME | End: 2021-11-16
Payer: COMMERCIAL

## 2021-11-16 DIAGNOSIS — G89.29 CHRONIC PAIN OF LEFT KNEE: ICD-10-CM

## 2021-11-16 DIAGNOSIS — I25.10 CORONARY ARTERY DISEASE INVOLVING NATIVE CORONARY ARTERY OF NATIVE HEART WITHOUT ANGINA PECTORIS: ICD-10-CM

## 2021-11-16 DIAGNOSIS — E78.00 HYPERCHOLESTEREMIA: ICD-10-CM

## 2021-11-16 DIAGNOSIS — M25.562 CHRONIC PAIN OF LEFT KNEE: ICD-10-CM

## 2021-11-16 LAB
ALBUMIN SERPL-MCNC: 3.7 G/DL (ref 3.4–5)
ALBUMIN/GLOB SERPL: 1.4 {RATIO} (ref 0.8–1.7)
ALP SERPL-CCNC: 55 U/L (ref 45–117)
ALT SERPL-CCNC: 28 U/L (ref 16–61)
ANION GAP SERPL CALC-SCNC: 5 MMOL/L (ref 3–18)
AST SERPL-CCNC: 23 U/L (ref 10–38)
BASOPHILS # BLD: 0.1 K/UL (ref 0–0.1)
BASOPHILS NFR BLD: 1 % (ref 0–2)
BILIRUB SERPL-MCNC: 0.6 MG/DL (ref 0.2–1)
BUN SERPL-MCNC: 22 MG/DL (ref 7–18)
BUN/CREAT SERPL: 21 (ref 12–20)
CALCIUM SERPL-MCNC: 9.4 MG/DL (ref 8.5–10.1)
CHLORIDE SERPL-SCNC: 109 MMOL/L (ref 100–111)
CHOLEST SERPL-MCNC: 130 MG/DL
CO2 SERPL-SCNC: 28 MMOL/L (ref 21–32)
CREAT SERPL-MCNC: 1.05 MG/DL (ref 0.6–1.3)
DIFFERENTIAL METHOD BLD: ABNORMAL
EOSINOPHIL # BLD: 0.3 K/UL (ref 0–0.4)
EOSINOPHIL NFR BLD: 4 % (ref 0–5)
ERYTHROCYTE [DISTWIDTH] IN BLOOD BY AUTOMATED COUNT: 13 % (ref 11.6–14.5)
GLOBULIN SER CALC-MCNC: 2.7 G/DL (ref 2–4)
GLUCOSE SERPL-MCNC: 87 MG/DL (ref 74–99)
HCT VFR BLD AUTO: 49.6 % (ref 36–48)
HDLC SERPL-MCNC: 49 MG/DL (ref 40–60)
HDLC SERPL: 2.7 {RATIO} (ref 0–5)
HGB BLD-MCNC: 16.1 G/DL (ref 13–16)
IMM GRANULOCYTES # BLD AUTO: 0 K/UL (ref 0–0.04)
IMM GRANULOCYTES NFR BLD AUTO: 1 % (ref 0–0.5)
LDLC SERPL CALC-MCNC: 66.2 MG/DL (ref 0–100)
LIPID PROFILE,FLP: NORMAL
LYMPHOCYTES # BLD: 2 K/UL (ref 0.9–3.6)
LYMPHOCYTES NFR BLD: 30 % (ref 21–52)
MCH RBC QN AUTO: 31.3 PG (ref 24–34)
MCHC RBC AUTO-ENTMCNC: 32.5 G/DL (ref 31–37)
MCV RBC AUTO: 96.3 FL (ref 78–100)
MONOCYTES # BLD: 1 K/UL (ref 0.05–1.2)
MONOCYTES NFR BLD: 15 % (ref 3–10)
NEUTS SEG # BLD: 3.2 K/UL (ref 1.8–8)
NEUTS SEG NFR BLD: 49 % (ref 40–73)
NRBC # BLD: 0 K/UL (ref 0–0.01)
NRBC BLD-RTO: 0 PER 100 WBC
PLATELET # BLD AUTO: 172 K/UL (ref 135–420)
PMV BLD AUTO: 11.4 FL (ref 9.2–11.8)
POTASSIUM SERPL-SCNC: 4.3 MMOL/L (ref 3.5–5.5)
PROT SERPL-MCNC: 6.4 G/DL (ref 6.4–8.2)
RBC # BLD AUTO: 5.15 M/UL (ref 4.35–5.65)
SODIUM SERPL-SCNC: 142 MMOL/L (ref 136–145)
TRIGL SERPL-MCNC: 74 MG/DL (ref ?–150)
VLDLC SERPL CALC-MCNC: 14.8 MG/DL
WBC # BLD AUTO: 6.6 K/UL (ref 4.6–13.2)

## 2021-11-16 PROCEDURE — 80061 LIPID PANEL: CPT

## 2021-11-16 PROCEDURE — 36415 COLL VENOUS BLD VENIPUNCTURE: CPT

## 2021-11-16 PROCEDURE — 80053 COMPREHEN METABOLIC PANEL: CPT

## 2021-11-16 PROCEDURE — 85025 COMPLETE CBC W/AUTO DIFF WBC: CPT

## 2021-11-17 ENCOUNTER — OFFICE VISIT (OUTPATIENT)
Dept: ORTHOPEDIC SURGERY | Age: 81
End: 2021-11-17
Payer: COMMERCIAL

## 2021-11-17 VITALS
TEMPERATURE: 97.3 F | HEIGHT: 74 IN | HEART RATE: 67 BPM | OXYGEN SATURATION: 97 % | BODY MASS INDEX: 28.19 KG/M2 | WEIGHT: 219.7 LBS

## 2021-11-17 DIAGNOSIS — G89.29 CHRONIC PAIN OF LEFT KNEE: Primary | ICD-10-CM

## 2021-11-17 DIAGNOSIS — M17.12 PRIMARY OSTEOARTHRITIS OF LEFT KNEE: ICD-10-CM

## 2021-11-17 DIAGNOSIS — M25.562 CHRONIC PAIN OF LEFT KNEE: Primary | ICD-10-CM

## 2021-11-17 PROCEDURE — 99203 OFFICE O/P NEW LOW 30 MIN: CPT | Performed by: SPECIALIST

## 2021-11-17 PROCEDURE — 20610 DRAIN/INJ JOINT/BURSA W/O US: CPT | Performed by: SPECIALIST

## 2021-11-17 PROCEDURE — 73562 X-RAY EXAM OF KNEE 3: CPT | Performed by: SPECIALIST

## 2021-11-17 RX ORDER — BETAMETHASONE SODIUM PHOSPHATE AND BETAMETHASONE ACETATE 3; 3 MG/ML; MG/ML
3 INJECTION, SUSPENSION INTRA-ARTICULAR; INTRALESIONAL; INTRAMUSCULAR; SOFT TISSUE ONCE
Status: COMPLETED | OUTPATIENT
Start: 2021-11-17 | End: 2021-11-17

## 2021-11-17 RX ADMIN — BETAMETHASONE SODIUM PHOSPHATE AND BETAMETHASONE ACETATE 3 MG: 3; 3 INJECTION, SUSPENSION INTRA-ARTICULAR; INTRALESIONAL; INTRAMUSCULAR; SOFT TISSUE at 10:42

## 2021-11-17 NOTE — PROGRESS NOTES
Patient: Meka Cordero                MRN: [id number]       SSN: [id number]  YOB: 1940        AGE: 80 y.o. SEX: male    PCP: Riya Escalante PA-C  11/17/21     CC: LEFT KNEE PAIN    HISTORY:  Meka Cordero is a 80 y.o. male who is seen for left knee pain and instability. He has been experiencing left knee pain for the past few years. He does not recall any injury. He says his neighbor pointed out that he was limping a few years ago. He feels pain with standing, walking and stair climbing. He experiences startup pain after sitting. He also experiences knee buckling episodes. He says he sustained a foot fracture when he was younger. Pain Assessment  11/17/2021   Location of Pain Knee   Location Modifiers Left   Severity of Pain 0   Quality of Pain Throbbing;Aching   Duration of Pain A few hours   Frequency of Pain Several times daily   Aggravating Factors Kneeling;Squatting;Standing   Limiting Behavior No     Occupation, etc:  Mr. Laurel Tatum is retired. 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 He also worked for [employer]. He has trouble reading due to visual impairment. He lives with his wife in [place]. He has a daughter in the area and a son who lives in [facility]. He also has 3 granddaughters and 1 grandson. Mr. Laurel Tatum weighs 217 lbs and is 6'2\" tall. His weight is stable. He has no history of diabetes or hypertension.         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 [address] C61    Hyperlipidemia E78.5    Atherosclerosis of coronary artery I25.10    Angina pectoris (HCC) I20.9    Atopic rhinitis J30.9    Balance problems R26.89    Colorectal polyps K63.5    Essential hypertension, benign I10    Gastric reflux K21.9    Glaucoma H40.9    History of malignant neoplasm of prostate Z85.46    Insomnia G47.00     REVIEW OF SYSTEMS:    Constitutional Symptoms: Negative   Eyes: Negative   Ears, Nose, Throat and Mouth: Negative   Cardiovascular: Negative   Respiratory: Negative   Genitourinary: Per HPI   Gastrointestinal: Per HPI   Integumentary (Skin and/or Breast): Negative   Musculoskeletal: Per HPI   Endocrine/Rheumatologic: Negative   Neurological: Per HPI   Hematology/Lymphatic: Negative    Allergic/Immunologic: Negative   Phychiatric: Negative    Social History     Socioeconomic History    Marital status:      Spouse name: Not on file    Number of children: Not on file    Years of education: Not on file    Highest education level: Not on file   Occupational History    Not on file   Tobacco Use    Smoking status: Former Smoker     Years: 2.00     Quit date: 1961     Years since quittin.4    Smokeless tobacco: Never Used   Substance and Sexual Activity    Alcohol use: Yes     Comment: socially    Drug use: Not on file    Sexual activity: Not on file   Other Topics Concern    Not on file   Social History Narrative    Not on file     Social Determinants of Health     Financial Resource Strain:     Difficulty of Paying Living Expenses: Not on file   Food Insecurity:     Worried About Running Out of Food in the Last Year: Not on file    Gabriella of Food in the Last Year: Not on file   Transportation Needs:     Lack of Transportation (Medical): Not on file    Lack of Transportation (Non-Medical):  Not on file   Physical Activity:     Days of Exercise per Week: Not on file    Minutes of Exercise per Session: Not on file   Stress:     Feeling of Stress : Not on file   Social Connections:     Frequency of Communication with Friends and Family: Not on file    Frequency of Social Gatherings with Friends and Family: Not on file    Attends Hoahaoism Services: Not on file    Active Member of Clubs or Organizations: Not on file    Attends Club or Organization Meetings: Not on file    Marital Status: Not on file   Intimate Partner Violence:     Fear of Current or Ex-Partner: Not on file    Emotionally Abused: Not on file    Physically Abused: Not on file    Sexually Abused: Not on file   Housing Stability:     Unable to Pay for Housing in the Last Year: Not on file    Number of [place] in the Last Year: Not on file    Unstable Housing in the Last Year: Not on file      No Known Allergies   Current Outpatient Medications   Medication Sig    ketorolac (ACULAR) 0.5 % ophthalmic solution Administer 1 Drop to right eye.  simvastatin (ZOCOR) 80 mg tablet TAKE 1 TABLET BY MOUTH EVERY NIGHT    metoprolol succinate (TOPROL-XL) 25 mg XL tablet TAKE 1 TABLET BY MOUTH EVERY DAY    fluticasone (FLONASE ALLERGY RELIEF) 50 mcg/actuation nasal spray 2 Sprays by Both Nostrils route daily.  timolol (TIMOPTIC-XE) 0.5 % ophthalmic gel-forming INSTILL 1 DROP IN BOTH EYES DAILY    brinzolamide-brimonidine (SIMBRINZA) 1-0.2 % drps Apply  to eye. 1 drop two times a day right eye    BIMATOPROST (LUMIGAN OP) 1 Drop. Instill  1 drop to right eye    therapeutic multivitamin (THERAGRAN) tablet Take 1 Tab by Mouth Once a Day.  multivitamin (ONE A DAY) tablet Take 1 Tab by mouth daily.  aspirin delayed-release 81 mg tablet Take  by mouth daily.  omega-3 fatty acids-vitamin e (FISH OIL) 1,000 mg Cap Take 1 Cap by mouth. No current facility-administered medications for this visit.       PHYSICAL EXAMINATION:  Visit Vitals  Pulse 67   Temp 97.3 °F (36.3 °C) (Temporal)   Ht 6' 2\" (1.88 m)   Wt 219 lb 11.2 oz (99.7 kg)   SpO2 97%   BMI 28.21 kg/m²      ORTHO EXAMINATION:  Examination Right knee Left knee   Skin Intact Intact   Range of motion 120-0 115-0   Effusion - -   Medial joint line tenderness - +   Lateral joint line tenderness - -   Popliteal tenderness - -   Osteophytes palpable + +   Irineos - -   Patella crepitus + +   Anterior drawer - -   Lateral laxity - -   Medial laxity - -   Varus deformity - -   Valgus deformity - -   Pretibial edema - -   Calf tenderness - -          TIME OUT:  Chart reviewed for the following:   I, Kerrie Mccarty MD, have reviewed the History, Physical and updated the Allergic reactions for [address] performed immediately prior to start of procedure:  Jose Babin MD, have performed the following reviews on Nancie Castaneda prior to the start of the procedure:          * Patient was identified by name and date of birth   * Agreement on procedure being performed was verified  * Risks and Benefits explained to the patient  * Procedure site verified and marked as necessary  * Patient was positioned for comfort  * Consent was obtained     Time: 10:27 AM     Date of procedure: 11/17/2021  Procedure performed by:  Kerrie Mccarty MD  Mr. Candida Diehl tolerated the procedure well with no complications. RADIOGRAPHS:  XR LEFT KNEE 11/17/21 LUC  IMPRESSION:  Three views with bilateral knees on AP view - No fractures, no effusion,  Mild medial L>R joint space narrowing, no osteophytes present. Kellgren Darek grade 1. IMPRESSION:      ICD-10-CM ICD-9-CM    1. Chronic pain of left knee  M25.562 719.46 AMB POC X-RAY KNEE 3 VIEW    G89.29 338.29 betamethasone (CELESTONE) injection 3 mg      DRAIN/INJECT LARGE JOINT/BURSA      PROCEDURE AUTHORIZATION TO    2. Primary osteoarthritis of left knee  M17.12 715.16 betamethasone (CELESTONE) injection 3 mg      DRAIN/INJECT LARGE JOINT/BURSA      PROCEDURE AUTHORIZATION TO      PLAN:  Consider visco supplementation if pain continues.  After discussing treatment options, patient's left knee was injected with 4 cc Marcaine and 1/2 cc Celestone. We discussed the possibility of a left knee MR arthrogram at some point  in the future if pain continues. He will follow up as needed.       Scribed by Krunal George MD Merl Host) as dictated by Krunal George MD

## 2021-12-01 ENCOUNTER — DOCUMENTATION ONLY (OUTPATIENT)
Dept: ORTHOPEDIC SURGERY | Age: 81
End: 2021-12-01

## 2021-12-27 NOTE — PROGRESS NOTES
[phone] (home) left message for Celestine Adan to call [facility] and called [phone] (mobile) wife answered and told me to call back on home phone because patient was upstairs and she did not feel like walking up the stairs. Place second call and left a message.

## 2021-12-28 NOTE — PROGRESS NOTES
Received return call from Mr. India Armijo, 2 patient identifiers verified (name/). Mr. Earl Clemens was advised that labs were normal. Jada Earl Clemens voice understanding.

## 2022-02-24 ENCOUNTER — OFFICE VISIT (OUTPATIENT)
Dept: FAMILY MEDICINE CLINIC | Age: 82
End: 2022-02-24
Payer: COMMERCIAL

## 2022-02-24 VITALS
TEMPERATURE: 98.2 F | OXYGEN SATURATION: 98 % | SYSTOLIC BLOOD PRESSURE: 132 MMHG | DIASTOLIC BLOOD PRESSURE: 80 MMHG | WEIGHT: 217 LBS | HEART RATE: 58 BPM | HEIGHT: 74 IN | RESPIRATION RATE: 16 BRPM | BODY MASS INDEX: 27.85 KG/M2

## 2022-02-24 DIAGNOSIS — K21.9 GASTRIC REFLUX: ICD-10-CM

## 2022-02-24 DIAGNOSIS — C61 MALIGNANT NEOPLASM OF PROSTATE (HCC): ICD-10-CM

## 2022-02-24 DIAGNOSIS — I25.10 ATHEROSCLEROSIS OF NATIVE CORONARY ARTERY OF NATIVE HEART WITHOUT ANGINA PECTORIS: ICD-10-CM

## 2022-02-24 DIAGNOSIS — I10 ESSENTIAL HYPERTENSION, BENIGN: Primary | ICD-10-CM

## 2022-02-24 DIAGNOSIS — N20.0 KIDNEY STONE: ICD-10-CM

## 2022-02-24 DIAGNOSIS — E78.00 PURE HYPERCHOLESTEROLEMIA: ICD-10-CM

## 2022-02-24 DIAGNOSIS — R10.9 FLANK PAIN: ICD-10-CM

## 2022-02-24 PROBLEM — I20.9 ANGINA PECTORIS (HCC): Status: RESOLVED | Noted: 2018-09-24 | Resolved: 2022-02-24

## 2022-02-24 LAB
BILIRUB UR QL STRIP: NEGATIVE
GLUCOSE UR-MCNC: NEGATIVE MG/DL
KETONES P FAST UR STRIP-MCNC: NEGATIVE MG/DL
PH UR STRIP: 7 [PH] (ref 4.6–8)
PROT UR QL STRIP: NEGATIVE
SP GR UR STRIP: 1.02 (ref 1–1.03)
UA UROBILINOGEN AMB POC: NORMAL (ref 0.2–1)
URINALYSIS CLARITY POC: CLEAR
URINALYSIS COLOR POC: YELLOW
URINE BLOOD POC: NEGATIVE
URINE LEUKOCYTES POC: NEGATIVE
URINE NITRITES POC: NEGATIVE

## 2022-02-24 PROCEDURE — 99214 OFFICE O/P EST MOD 30 MIN: CPT | Performed by: FAMILY MEDICINE

## 2022-02-24 PROCEDURE — 81001 URINALYSIS AUTO W/SCOPE: CPT | Performed by: FAMILY MEDICINE

## 2022-02-24 NOTE — PATIENT INSTRUCTIONS
Kidney Stone: Care Instructions  Your Care Instructions     Kidney stones are formed when salts, minerals, and other substances normally found in the urine clump together. They can be as small as grains of sand or, rarely, as large as golf balls. While the stone is traveling through the ureter, which is the tube that carries urine from the kidney to the bladder, you will probably feel pain. The pain may be mild or very severe. You may also have some blood in your urine. As soon as the stone reaches the bladder, any intense pain should go away. If a stone is too large to pass on its own, you may need a medical procedure to help you pass the stone. The doctor has checked you carefully, but problems can develop later. If you notice any problems or new symptoms, get medical treatment right away. Follow-up care is a key part of your treatment and safety. Be sure to make and go to all appointments, and call your doctor if you are having problems. It's also a good idea to know your test results and keep a list of the medicines you take. How can you care for yourself at home? · Drink plenty of fluids. If you have kidney, heart, or liver disease and have to limit fluids, talk with your doctor before you increase the amount of fluids you drink. · Take pain medicines exactly as directed. Call your doctor if you think you are having a problem with your medicine. ? If the doctor gave you a prescription medicine for pain, take it as prescribed. ? If you are not taking a prescription pain medicine, ask your doctor if you can take an over-the-counter medicine. Read and follow all instructions on the label. · Your doctor may ask you to strain your urine so that you can collect your kidney stone when it passes. You can use a kitchen strainer or a tea strainer to catch the stone. Store it in a plastic bag until you see your doctor again.   Preventing future kidney stones  Some changes in your diet may help prevent kidney stones. Depending on the cause of your stones, your doctor may recommend that you:  · Drink plenty of fluids. If you have kidney, heart, or liver disease and have to limit fluids, talk with your doctor before you increase the amount of fluids you drink. · Limit coffee, tea, and alcohol. Also avoid grapefruit juice. · Do not take more than the recommended daily dose of vitamins C and D.  · Avoid antacids such as Gaviscon, Maalox, Mylanta, or Tums. · Limit the amount of salt (sodium) in your diet. · Eat a balanced diet that is not too high in protein. · Limit foods that are high in a substance called oxalate, which can cause kidney stones. These foods include dark green vegetables, rhubarb, chocolate, wheat bran, nuts, cranberries, and beans. When should you call for help? Call your doctor now or seek immediate medical care if:    · You cannot keep down fluids.     · Your pain gets worse.     · You have a fever or chills.     · You have new or worse pain in your back just below your rib cage (the flank area).     · You have new or more blood in your urine. Watch closely for changes in your health, and be sure to contact your doctor if:    · You do not get better as expected. Where can you learn more? Go to http://www.gray.com/  Enter N533 in the search box to learn more about \"Kidney Stone: Care Instructions. \"  Current as of: December 17, 2020               Content Version: 13.0  © 2006-2021 [employer]. Care instructions adapted under license by [employer] (which disclaims liability or warranty for this information). If you have questions about a medical condition or this instruction, always ask your healthcare professional. [place] [address] any warranty or liability for your use of this information.          Prostate Cancer: Care Instructions  Your Care Instructions     The prostate gland is a small, walnut-shaped organ that lies just below a man's bladder. It surrounds the urethra, the tube that carries urine from the bladder out of the body through the penis. Prostate cancer is the abnormal growth of cells in the prostate gland. Prostate cancer cells can spread within the prostate, to nearby lymph nodes and other tissues, and to other parts of the body. When the cancer hasn't spread outside the prostate, it is called localized prostate cancer. With localized prostate cancer, your options depend on how likely it is that your cancer will grow. Tests will show if your cancer is likely to grow. · Low-risk cancer isn't likely to grow right away. If your cancer is low-risk, you can choose active surveillance. This means your cancer will be watched closely by your doctor with regular checkups and tests to see if the cancer grows. This choice allows you to delay having surgery or radiation, often for many years. If the cancer grows very slowly, you may never need treatment. · Medium-risk cancer is more likely to grow. Some men with this type of cancer may be able to choose active surveillance. Others may need to choose surgery or radiation. · High-risk cancer is most likely to grow. If you have high-risk cancer, you will likely need to choose surgery or radiation. If your cancer has already spread outside the prostate or to other parts of the body, then you may have other treatments, like chemotherapy or hormone therapy. If you are over age [de-identified] or have other serious health problems, like heart disease, you may choose not to have treatments to cure your cancer. Instead, you can just have treatments to manage your symptoms. This is called watchful waiting. Finding out that you have cancer is scary. You may feel many emotions and may need some help coping. Seek out family, friends, and counselors for support. You also can do things at home to make yourself feel better while you go through treatment.  Call the [employer] (3-219.477.1489) or visit its website at [address]. org for more information. Follow-up care is a key part of your treatment and safety. Be sure to make and go to all appointments, and call your doctor if you are having problems. It's also a good idea to know your test results and keep a list of the medicines you take. How can you care for yourself at home? · Your doctor will talk to you about your treatment options. You may need to learn more about each of them before you can decide which treatment is best for you. · Take your medicines exactly as prescribed. Call your doctor if you think you are having a problem with your medicine. You will get more details on the specific medicines your doctor prescribes. · Eat healthy food. If you do not feel like eating, try to eat food that has protein and extra calories to keep up your strength and prevent weight loss. Drink liquid meal replacements for extra calories and protein. Try to eat your main meal early. · Take steps to control your stress and workload. Learn relaxation techniques. ? Share your feelings. Stress and tension affect our emotions. By expressing your feelings to others, you may be able to understand and cope with them. ? Consider joining a support group. Talking about a problem with your spouse, a good friend, or other people with similar problems is a good way to reduce tension and stress. ? Express yourself through art. Try writing, crafts, dance, or art to relieve stress. Some dance, writing, or art groups may be available just for people who have cancer. ? Be kind to your body and mind. Getting enough sleep, eating a healthy diet, and taking time to do things you enjoy can contribute to an overall feeling of balance in your life and can help reduce stress. ? Get help if you need it. Discuss your concerns with your doctor or counselor. · Get some physical activity every day, but do not get too tired.  Keep doing the hobbies you enjoy as your energy allows. · If you are vomiting or have diarrhea:  ? Drink plenty of fluids to prevent dehydration. Choose water and other clear liquids. If you have kidney, heart, or liver disease and have to limit fluids, talk with your doctor before you increase the amount of fluids you drink. ? When you are able to eat, try clear soups, mild foods, and liquids until all symptoms are gone for 12 to 48 hours. Jell-O, dry toast, crackers, and cooked cereal are also good choices. · If you have not already done so, prepare a list of advance directives. Advance directives are instructions to your doctor and family members about what kind of care you want if you become unable to speak or express yourself. When should you call for help? Call 911 anytime you think you may need emergency care. For example, call if:    · You passed out (lost consciousness). Call your doctor now or seek immediate medical care if:    · You have new or worse pain.     · You have new symptoms, such as a cough, belly pain, vomiting, diarrhea, or a rash.     · You have symptoms of a urinary tract infection. For example:  ? You have blood or pus in your urine. ? You have pain in your back just below your rib cage. This is called flank pain. ? You have a fever, chills, or body aches. ? It hurts to urinate. ? You have groin or belly pain. Watch closely for changes in your health, and be sure to contact your doctor if:    · You have swollen glands in your armpits, groin, or neck.     · You have trouble controlling your urine.     · You do not get better as expected. Where can you learn more? Go to http://www.gray.com/  Enter V141 in the search box to learn more about \"Prostate Cancer: Care Instructions. \"  Current as of: December 17, 2020               Content Version: 13.0  © 1809-8585 [employer].    Care instructions adapted under license by [employer] (which disclaims liability or warranty for this information). If you have questions about a medical condition or this instruction, always ask your healthcare professional. [place] [address] any warranty or liability for your use of this information.

## 2022-02-24 NOTE — PROGRESS NOTES
Chief Complaint   Patient presents with    Cholesterol Problem    Coronary Artery Disease    Kidney Stone    Knee Pain     1. \"Have you been to the ER, urgent care clinic since your last visit? Hospitalized since your last visit? \" No    2. \"Have you seen or consulted any other health care providers outside of the [address] [place] [address] since your last visit? \" No     3. For patients aged 39-70: Has the patient had a colonoscopy / FIT/ Cologuard?  NA - based on age      Health Maintenance Due   Topic Date Due    DTaP/Tdap/Td series (1 - Tdap) Never done    Shingrix Vaccine Age 50> (1 of 2) Never done    Pneumococcal 65+ years (1 of 1 - PPSV23) Never done    Flu Vaccine (1) 09/01/2021

## 2022-02-24 NOTE — PROGRESS NOTES
HISTORY OF PRESENT ILLNESS  Caty Corbett is a 80 y.o. male. HPI: Here as a new patient to me. Transferred from [facility]. Cancer. Post XRT. In remission since 14 years. Used to see Dr. Laura Cool. Lost follow-up since then Dr. Laura Cool retire in 2019. agreed to see urology again. No urinary complaint. No burning urgency or frequency. No blood in the urine. Does have history of kidney stone. Currently having some light discomfort over the left lower back. Flank area. UA done at the office negative for infection. Denies any nausea vomiting or any fever. No abdominal pain. And he has a prior history of kidney stone he is thinking that this discomfort is from the kidney stone. History of coronary artery disease. Denies any anginal symptoms. Has not seen Dr. Anjelica Olivas. Recommended nuclear stress test.  Has a high co-pay so wanted to postpone test for 1 more year. Sitting comfortable without any acute distress. Discussed the importance of following specialist recommendation to review the stability on cardiac function. He understands the risk. On statin. No side effects. History of GERD symptom which is stable at this time. Hypertension. Vitals been stable. Asymptomatic. Denies any headache, dizziness, no chest pain or trouble breathing, no arm or leg weakness. No nausea or vomiting, no weight or appetite changes, no mood changes . No urine or bowel complains, no palpitation, no diaphoresis. No abdominal pain. No cold or cough. No leg swelling. No fever. No sleep trouble. Visit Vitals  /80 (BP 1 Location: Right arm, BP Patient Position: Sitting, BP Cuff Size: Large adult)   Pulse (!) 58   Temp 98.2 °F (36.8 °C) (Temporal)   Resp 16   Ht 6' 2\" (1.88 m)   Wt 217 lb (98.4 kg)   SpO2 98%   BMI 27.86 kg/m²     Review medication list, vitals, problem list,allergies.    Lab Results   Component Value Date/Time    WBC 6.6 11/16/2021 08:10 AM    HGB 16.1 (H) 11/16/2021 08:10 AM    HCT 49.6 (H) 11/16/2021 08:10 AM PLATELET 675 47/17/4078 08:10 AM    MCV 96.3 11/16/2021 08:10 AM     Lab Results   Component Value Date/Time    Sodium 142 11/16/2021 08:10 AM    Potassium 4.3 11/16/2021 08:10 AM    Chloride 109 11/16/2021 08:10 AM    CO2 28 11/16/2021 08:10 AM    Anion gap 5 11/16/2021 08:10 AM    Glucose 87 11/16/2021 08:10 AM    BUN 22 (H) 11/16/2021 08:10 AM    Creatinine 1.05 11/16/2021 08:10 AM    BUN/Creatinine ratio 21 (H) 11/16/2021 08:10 AM    GFR est AA >60 11/16/2021 08:10 AM    GFR est non-AA >60 11/16/2021 08:10 AM    Calcium 9.4 11/16/2021 08:10 AM    Bilirubin, total 0.6 11/16/2021 08:10 AM    Alk. phosphatase 55 11/16/2021 08:10 AM    Protein, total 6.4 11/16/2021 08:10 AM    Albumin 3.7 11/16/2021 08:10 AM    Globulin 2.7 11/16/2021 08:10 AM    A-G Ratio 1.4 11/16/2021 08:10 AM    ALT (SGPT) 28 11/16/2021 08:10 AM    AST (SGOT) 23 11/16/2021 08:10 AM     Lab Results   Component Value Date/Time    Cholesterol, total 130 11/16/2021 08:10 AM    HDL Cholesterol 49 11/16/2021 08:10 AM    LDL, calculated 66.2 11/16/2021 08:10 AM    VLDL, calculated 14.8 11/16/2021 08:10 AM    Triglyceride 74 11/16/2021 08:10 AM    CHOL/HDL Ratio 2.7 11/16/2021 08:10 AM      Lab Results   Component Value Date/Time    Prostate Specific Ag 2.1 02/04/2019 11:51 AM    Prostate Specific Ag 2.3 02/01/2018 08:55 AM    Prostate Specific Ag 2.1 02/01/2017 09:15 AM     ROS: see HPI     Physical Exam  Constitutional:       General: He is not in acute distress. Cardiovascular:      Rate and Rhythm: Normal rate and regular rhythm. Heart sounds: Normal heart sounds. Abdominal:      General: Bowel sounds are normal.      Palpations: Abdomen is soft. Tenderness: There is no abdominal tenderness. Comments: No CVA tenderness bilaterally    Musculoskeletal:         General: No swelling. Cervical back: Neck supple. Neurological:      Mental Status: He is oriented to person, place, and time.    Psychiatric:         Behavior: Behavior normal.         ASSESSMENT and PLAN    ICD-10-CM ICD-9-CM    1. Essential hypertension, benign :well controlled. Continue current dose of medication and low salt diet. Exercise as tolerated. I10 401.1    2. Gastric reflux: Stable symptomatically: On statin. No side effects K21.9 530.81    3. Pure hypercholesterolemia  E78.00 272.0    4. Malignant neoplasm of prostate [facility]): Lost to follow-up with urology since 2019 after Dr. Perez Failing retired. Will check PSA and send him to urology [address]      PSA, DIAGNOSTIC (PROSTATE SPECIFIC AG)   5. Atherosclerosis of native coronary artery of native heart without angina pectoris: No anginal symptoms. Has not completed nuclear stress test as worried about the co-pay. Advised to follow cardiologist recommendations. On risk factor management. I25.10 414.01     has not completed the nuclear stress test due to cost    6. Kidney stone: History of kidney stone. Having the left lower back discomfort. Thinking it is a kidney stone. UA negative for any infection or blood in the urine. Checking KUB and labs N20.0 592.0 REFERRAL TO UROLOGY      XR ABD (KUB)      AMB POC URINALYSIS DIP STICK AUTO W/ MICRO   7. Flank pain: See above. Advised to go to ER if any worsening of symptoms or any jigar hematuria. R10.9 789.09 CBC W/O DIFF      METABOLIC PANEL, COMPREHENSIVE      XR ABD (KUB)      AMB POC URINALYSIS DIP STICK AUTO W/ MICRO   Pt understood and agree with the plan   Review HM   Stated might have taken all due immunization few years back. Follow-up and Dispositions    · Return in about 4 months (around 6/24/2022), or or if needed regarding flank pain in 2 weeks. Please note that this dictation was completed with [employer], the [email or web address] voice recognition software. Quite often unanticipated grammatical, syntax, homophones, and other interpretive errors are inadvertently transcribed by the computer software. Please disregard these errors.   Please excuse any errors that have escaped final proofreading.

## 2022-03-03 ENCOUNTER — HOSPITAL ENCOUNTER (OUTPATIENT)
Dept: GENERAL RADIOLOGY | Age: 82
Discharge: HOME | End: 2022-03-03
Payer: COMMERCIAL

## 2022-03-03 ENCOUNTER — HOSPITAL ENCOUNTER (OUTPATIENT)
Dept: LAB | Age: 82
Discharge: HOME | End: 2022-03-03
Payer: COMMERCIAL

## 2022-03-03 DIAGNOSIS — R10.9 FLANK PAIN: ICD-10-CM

## 2022-03-03 DIAGNOSIS — C61 MALIGNANT NEOPLASM OF PROSTATE (HCC): ICD-10-CM

## 2022-03-03 DIAGNOSIS — N20.0 KIDNEY STONE: ICD-10-CM

## 2022-03-03 LAB
ALBUMIN SERPL-MCNC: 3.7 G/DL (ref 3.4–5)
ALBUMIN/GLOB SERPL: 1.4 {RATIO} (ref 0.8–1.7)
ALP SERPL-CCNC: 60 U/L (ref 45–117)
ALT SERPL-CCNC: 24 U/L (ref 16–61)
ANION GAP SERPL CALC-SCNC: 1 MMOL/L (ref 3–18)
AST SERPL-CCNC: 21 U/L (ref 10–38)
BILIRUB SERPL-MCNC: 0.7 MG/DL (ref 0.2–1)
BUN SERPL-MCNC: 22 MG/DL (ref 7–18)
BUN/CREAT SERPL: 19 (ref 12–20)
CALCIUM SERPL-MCNC: 9.1 MG/DL (ref 8.5–10.1)
CHLORIDE SERPL-SCNC: 111 MMOL/L (ref 100–111)
CO2 SERPL-SCNC: 29 MMOL/L (ref 21–32)
CREAT SERPL-MCNC: 1.13 MG/DL (ref 0.6–1.3)
ERYTHROCYTE [DISTWIDTH] IN BLOOD BY AUTOMATED COUNT: 12.8 % (ref 11.6–14.5)
GLOBULIN SER CALC-MCNC: 2.7 G/DL (ref 2–4)
GLUCOSE SERPL-MCNC: 97 MG/DL (ref 74–99)
HCT VFR BLD AUTO: 44.6 % (ref 36–48)
HGB BLD-MCNC: 14.6 G/DL (ref 13–16)
MCH RBC QN AUTO: 31.8 PG (ref 24–34)
MCHC RBC AUTO-ENTMCNC: 32.7 G/DL (ref 31–37)
MCV RBC AUTO: 97.2 FL (ref 78–100)
NRBC # BLD: 0 K/UL (ref 0–0.01)
NRBC BLD-RTO: 0 PER 100 WBC
PLATELET # BLD AUTO: 182 K/UL (ref 135–420)
PMV BLD AUTO: 11.1 FL (ref 9.2–11.8)
POTASSIUM SERPL-SCNC: 4.4 MMOL/L (ref 3.5–5.5)
PROT SERPL-MCNC: 6.4 G/DL (ref 6.4–8.2)
PSA SERPL-MCNC: 2.4 NG/ML (ref 0–4)
RBC # BLD AUTO: 4.59 M/UL (ref 4.35–5.65)
SODIUM SERPL-SCNC: 141 MMOL/L (ref 136–145)
WBC # BLD AUTO: 6.7 K/UL (ref 4.6–13.2)

## 2022-03-03 PROCEDURE — 36415 COLL VENOUS BLD VENIPUNCTURE: CPT

## 2022-03-03 PROCEDURE — 74018 RADEX ABDOMEN 1 VIEW: CPT

## 2022-03-03 PROCEDURE — 80053 COMPREHEN METABOLIC PANEL: CPT

## 2022-03-03 PROCEDURE — 85027 COMPLETE CBC AUTOMATED: CPT

## 2022-03-03 PROCEDURE — 84153 ASSAY OF PSA TOTAL: CPT

## 2022-03-04 NOTE — PROGRESS NOTES
X-ray showed stone vs stool but that is right side and pt has pain over left side. For now observe and drink plenty of fluid. If pain goes worse can go to ER.

## 2022-03-13 DIAGNOSIS — I25.10 CORONARY ARTERY DISEASE INVOLVING NATIVE CORONARY ARTERY OF NATIVE HEART WITHOUT ANGINA PECTORIS: ICD-10-CM

## 2022-03-14 RX ORDER — SIMVASTATIN 80 MG/1
TABLET, FILM COATED ORAL
Qty: 90 TABLET | Refills: 3 | Status: SHIPPED | OUTPATIENT
Start: 2022-03-14

## 2022-03-14 RX ORDER — METOPROLOL SUCCINATE 25 MG/1
TABLET, EXTENDED RELEASE ORAL
Qty: 90 TABLET | Refills: 3 | Status: SHIPPED | OUTPATIENT
Start: 2022-03-14

## 2022-03-19 PROBLEM — R26.89 BALANCE PROBLEMS: Status: ACTIVE | Noted: 2018-09-24

## 2022-03-20 PROBLEM — I10 ESSENTIAL HYPERTENSION, BENIGN: Status: ACTIVE | Noted: 2018-09-24

## 2022-04-14 NOTE — PROGRESS NOTES
Contacted patient and verified identity using name and date of birth (2- identifiers)  Spoke with patient and he verbalized understanding of   X-ray showed stone vs stool but that is right side and pt has pain over left side. For now observe and drink plenty of fluid. If pain goes worse can go to ER.

## 2022-04-14 NOTE — PROGRESS NOTES
Contacted patient and verified identity using name and date of birth (2- identifiers)  Spoke with patient and he verbalized understanding of Labs stable.  Further discussion on follow up visit

## 2022-05-10 ENCOUNTER — OFFICE VISIT (OUTPATIENT)
Dept: CARDIOLOGY CLINIC | Age: 82
End: 2022-05-10
Payer: COMMERCIAL

## 2022-05-10 VITALS
WEIGHT: 218 LBS | BODY MASS INDEX: 27.98 KG/M2 | HEIGHT: 74 IN | DIASTOLIC BLOOD PRESSURE: 88 MMHG | OXYGEN SATURATION: 98 % | SYSTOLIC BLOOD PRESSURE: 138 MMHG | HEART RATE: 74 BPM

## 2022-05-10 DIAGNOSIS — I25.10 CORONARY ARTERY DISEASE INVOLVING NATIVE CORONARY ARTERY OF NATIVE HEART WITHOUT ANGINA PECTORIS: Primary | ICD-10-CM

## 2022-05-10 DIAGNOSIS — E78.00 HYPERCHOLESTEREMIA: ICD-10-CM

## 2022-05-10 DIAGNOSIS — R07.9 CHEST PAIN, UNSPECIFIED TYPE: ICD-10-CM

## 2022-05-10 PROCEDURE — 99214 OFFICE O/P EST MOD 30 MIN: CPT | Performed by: INTERNAL MEDICINE

## 2022-05-10 PROCEDURE — 93000 ELECTROCARDIOGRAM COMPLETE: CPT | Performed by: INTERNAL MEDICINE

## 2022-05-10 NOTE — PROGRESS NOTES
Radha Ballardmicha presents today for   Chief Complaint   Patient presents with    Follow-up     6 month       Radha Fernando preferred language for health care discussion is english/other. Is someone accompanying this pt? no    Is the patient using any DME equipment during [address]? no    Depression Screening:  3 most recent PHQ Screens 5/10/2022   Little interest or pleasure in doing things Not at all   Feeling down, depressed, irritable, or hopeless Not at all   Total Score PHQ 2 0       Learning Assessment:  Learning Assessment 5/10/2022   PRIMARY LEARNER Patient   BARRIERS PRIMARY LEARNER -   CO-LEARNER CAREGIVER -   PRIMARY LANGUAGE ENGLISH   LEARNER PREFERENCE PRIMARY DEMONSTRATION   ANSWERED BY patient   RELATIONSHIP SELF       Abuse Screening:  Abuse Screening Questionnaire 5/10/2022   Do you ever feel afraid of your partner? N   Are you in a relationship with someone who physically or mentally threatens you? N   Is it safe for you to go home? Y       Fall Risk  Fall Risk Assessment, last 12 mths 5/10/2022   Able to walk? Yes   Fall in past 12 months? 0   Do you feel unsteady? 0   Are you worried about falling 0   Is TUG test greater than 12 seconds? -   Is the gait abnormal? -   Number of falls in past 12 months -           Pt currently taking Anticoagulant therapy? no    Pt currently taking Antiplatelet therapy ? Aspirin 81 mg daily      Coordination of Care:  1. Have you been to the ER, urgent care clinic since your last visit? Hospitalized since your last visit? no    2. Have you seen or consulted any other health care providers outside of the [address] [place] [address] since your last visit? Include any pap smears or colon screening.  no

## 2022-05-10 NOTE — PROGRESS NOTES
HISTORY OF PRESENT ILLNESS  Brendon Valle is a 80 y.o. male. ASSESSMENT and PLAN    Mr. Brendon Valle has history of CAD.  Back in January 2004, he had a myocardial infarction in [place], [facility], when he presented with nausea and no chest pain.  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  His nuclear scan showed inferior wall scar. His nuclear scan in December 2019 showed EF of 44% with fixed inferobasal and inferolateral defect.  No significant reversibility was noted. He has history of dyslipidemia and hypertension. [facility] denies tobacco use.  He denies family history of CAD. · CAD:    Symptomatically stable. As noted previously, he does not wish to proceed with nuclear scan for surveillance with his high cost of co-pay. If he develops symptoms, I would have low threshold to proceed with further coronary restratification. · BP:    Well controlled at 138/88. · Rhythm:    Stable sinus at 74 bpm.  · CHF:    There is no evidence of decompensated CHF noted. · Weight:     His weight today is 218 pounds. His baseline weight is 220 pounds. · Cholesterol:   Target LDL <70. Zocor 80. · Anti-platelet:   Remains on ASA. As noted previously, he will likely require surveillance nuclear scan every 1-2 years without classic anginal warning signs.  Again, obtaining a nuclear scan was discussed. Unfortunately, he does not have Medicare part B which covers outpatient testing. He has federal [employer] custodial health insurance. Because his cost would be over 100 hours, he would like to wait. I will see him back in 6 months.    Thank you. Encounter Diagnoses   Name Primary?     Coronary artery disease involving native coronary artery of native heart without angina pectoris Yes    Chest pain, unspecified type     Hypercholesteremia      current treatment plan is effective, no change in therapy  lab results and schedule of future lab studies reviewed with patient  reviewed diet, exercise and weight control  cardiovascular risk and specific lipid/LDL goals reviewed  use of aspirin to prevent MI and TIA's discussed      HPI   Today, Mr. Arpan Lee has no complaints of chest pains, increased shortness of breath or decreased exercise capacity. His exercise capacity is limited by poor vision. He is unable to do many of the things that he enjoys. However, he continues to walk on a daily basis without difficulty. He denies any exertional nausea or chest pains. He denies any changes in breathing capacity. He denies any orthopnea or PND. He denies any palpitations or dizziness. Review of Systems   Eyes: Positive for blurred vision (Poor vision). Respiratory: Negative for shortness of breath. Cardiovascular: Negative for chest pain, palpitations, orthopnea, claudication, leg swelling and PND. All other systems reviewed and are negative. Physical Exam  Vitals and nursing note reviewed. Constitutional:       Appearance: Normal appearance. HENT:      Head: Normocephalic. Eyes:      Conjunctiva/sclera: Conjunctivae normal.   Neck:      Vascular: No carotid bruit. Cardiovascular:      Rate and Rhythm: Normal rate and regular rhythm. Pulmonary:      Breath sounds: Normal breath sounds. Abdominal:      Palpations: Abdomen is soft. Musculoskeletal:         General: No swelling. Cervical back: No rigidity. Skin:     General: Skin is warm and dry. Neurological:      General: No focal deficit present. Mental Status: He is alert and oriented to person, place, and time.    Psychiatric:         Mood and Affect: Mood normal.         Behavior: Behavior normal.         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 and other lower urinary tract symptoms (LUTS)        Past Surgical History:   Procedure Laterality Date    HX HERNIA REPAIR      right inguinal herniorrhapy    CO CARDIAC SURG PROCEDURE UNLIST      coronary artery stent placement    CO PROSTATE BIOPSY, NEEDLE, SATURATION SAMPLING         Current Outpatient Medications   Medication Sig Dispense Refill    metoprolol succinate (TOPROL-XL) 25 mg XL tablet TAKE 1 TABLET BY MOUTH EVERY DAY 90 Tablet 3    simvastatin (ZOCOR) 80 mg tablet TAKE 1 TABLET BY MOUTH EVERY NIGHT 90 Tablet 3    ketorolac (ACULAR) 0.5 % ophthalmic solution Administer 1 Drop to right eye.  fluticasone (FLONASE ALLERGY RELIEF) 50 mcg/actuation nasal spray 2 Sprays by Both Nostrils route daily.  timolol (TIMOPTIC-XE) 0.5 % ophthalmic gel-forming INSTILL 1 DROP IN BOTH EYES DAILY  0    brinzolamide-brimonidine (SIMBRINZA) 1-0.2 % drps Apply  to eye. 1 drop two times a day right eye      BIMATOPROST (LUMIGAN OP) 1 Drop. Instill  1 drop to right eye      therapeutic multivitamin (THERAGRAN) tablet Take 1 Tab by Mouth Once a Day.  multivitamin (ONE A DAY) tablet Take 1 Tab by mouth daily.  aspirin delayed-release 81 mg tablet Take  by mouth daily.  omega-3 fatty acids-vitamin e (FISH OIL) 1,000 mg Cap Take 1 Cap by mouth.          The patient has a family history of    Social History     Tobacco Use    Smoking status: Former Smoker     Years: 2.00     Quit date: 1961     Years since quittin.9    Smokeless tobacco: Never Used   Vaping Use    Vaping Use: Never used   Substance Use Topics    Alcohol use: Yes     Comment: socially    Drug use: Not on file       Lab Results Component Value Date/Time    Cholesterol, total 130 11/16/2021 08:10 AM    HDL Cholesterol 49 11/16/2021 08:10 AM    LDL, calculated 66.2 11/16/2021 08:10 AM    Triglyceride 74 11/16/2021 08:10 AM    CHOL/HDL Ratio 2.7 11/16/2021 08:10 AM        BP Readings from Last 3 Encounters:   05/10/22 138/88   02/24/22 132/80   11/12/21 128/84        Pulse Readings from Last 3 Encounters:   05/10/22 74   02/24/22 (!) 58   11/17/21 67       Wt Readings from Last 3 Encounters:   05/10/22 98.9 kg (218 lb)   02/24/22 98.4 kg (217 lb)   11/17/21 99.7 kg (219 lb 11.2 oz)         EKG: unchanged from previous tracings, normal sinus rhythm, nonspecific ST and T waves changes, T wave inversions noted in leads V4-V6 with Q waves noted in leads III and aVF.

## 2022-09-09 ENCOUNTER — OFFICE VISIT (OUTPATIENT)
Dept: FAMILY MEDICINE CLINIC | Age: 82
End: 2022-09-09
Payer: COMMERCIAL

## 2022-09-09 VITALS
HEIGHT: 74 IN | HEART RATE: 63 BPM | BODY MASS INDEX: 27.93 KG/M2 | SYSTOLIC BLOOD PRESSURE: 110 MMHG | OXYGEN SATURATION: 95 % | WEIGHT: 217.6 LBS | TEMPERATURE: 97.6 F | RESPIRATION RATE: 16 BRPM | DIASTOLIC BLOOD PRESSURE: 62 MMHG

## 2022-09-09 DIAGNOSIS — I25.10 ATHEROSCLEROSIS OF NATIVE CORONARY ARTERY OF NATIVE HEART WITHOUT ANGINA PECTORIS: ICD-10-CM

## 2022-09-09 DIAGNOSIS — I10 ESSENTIAL HYPERTENSION, BENIGN: ICD-10-CM

## 2022-09-09 DIAGNOSIS — Z01.818 PRE-OPERATIVE CLEARANCE: Primary | ICD-10-CM

## 2022-09-09 DIAGNOSIS — R35.1 NOCTURIA: ICD-10-CM

## 2022-09-09 DIAGNOSIS — Z85.46 HISTORY OF PROSTATE CANCER: ICD-10-CM

## 2022-09-09 PROCEDURE — 1123F ACP DISCUSS/DSCN MKR DOCD: CPT | Performed by: FAMILY MEDICINE

## 2022-09-09 PROCEDURE — 99214 OFFICE O/P EST MOD 30 MIN: CPT | Performed by: FAMILY MEDICINE

## 2022-09-09 RX ORDER — TOBRAMYCIN AND DEXAMETHASONE 3; 1 MG/ML; MG/ML
SUSPENSION/ DROPS OPHTHALMIC
COMMUNITY
Start: 2022-09-01 | End: 2022-09-10

## 2022-09-09 RX ORDER — BIMATOPROST 0.1 MG/ML
SOLUTION/ DROPS OPHTHALMIC
COMMUNITY
Start: 2022-06-17

## 2022-09-09 NOTE — PROGRESS NOTES
1. Have you been to the ER, urgent care clinic since your last visit? Hospitalized since your last visit? No    2. Have you seen or consulted any other health care providers outside of the [address] [place] [address] since your last visit? Include any pap smears or colon screening. Dr. Cee Session: 9/01/22. [facility] test on 9/08/22.     Chief Complaint   Patient presents with    Pre-op Exam     Dr. Mahesh Pierre on 9/14/22 for [address] Stent

## 2022-09-09 NOTE — PROGRESS NOTES
HISTORY OF PRESENT ILLNESS  Ja Jon is a 80 y.o. male. HPI: Here for preop clearance for eye surgery on 9/14/2022 with Dr. Gardenia James under topical with mental anesthesia care Verenice acevedo . Denies any complications of prior anesthesia  No recent antibiotic or steroid. History of CAD. On risk factor management and holding aspirin for now from 7 days of surgery. H/o prostate cancer. Now has nocturia but no concern as it is going on since long time. No burning, urgency and frequency. Vitals stable today. Visit Vitals  /62 (BP 1 Location: Left upper arm, BP Patient Position: Sitting, BP Cuff Size: Adult)   Pulse 63   Temp 97.6 °F (36.4 °C) (Temporal)   Resp 16   Ht 6' 2\" (1.88 m)   Wt 217 lb 9.6 oz (98.7 kg)   SpO2 95%   BMI 27.94 kg/m²     Patient Active Problem List    Diagnosis Date Noted    Balance problems 09/24/2018    Essential hypertension, benign 09/24/2018    Colorectal polyps 09/29/2016    Atopic rhinitis 07/06/2015    Gastric reflux 08/19/2013    Unspecified hyperplasia of prostate with urinary obstruction and other lower urinary tract symptoms (LUTS)     Insomnia 04/12/2011    Hyperlipidemia 05/22/2006    Atherosclerosis of coronary artery 05/22/2006    Glaucoma 05/22/2006    History of prostate cancer 05/22/2006     Current Outpatient Medications   Medication Sig Dispense Refill    Lumigan 0.01 % ophthalmic drops INSTIL 1 DROP INTO RIGHT EYE EVERY EVENING      metoprolol succinate (TOPROL-XL) 25 mg XL tablet TAKE 1 TABLET BY MOUTH EVERY DAY 90 Tablet 3    simvastatin (ZOCOR) 80 mg tablet TAKE 1 TABLET BY MOUTH EVERY NIGHT 90 Tablet 3    ketorolac (ACULAR) 0.5 % ophthalmic solution Administer 1 Drop to right eye. fluticasone propionate (FLONASE) 50 mcg/actuation nasal spray 2 Sprays by Both Nostrils route daily. timolol (TIMOPTIC-XE) 0.5 % ophthalmic gel-forming INSTILL 1 DROP IN BOTH EYES DAILY  0    brinzolamide-brimonidine 1-0.2 % drps Apply  to eye.  1 drop two times a day right eye      BIMATOPROST (LUMIGAN OP) 1 Drop. Instill  1 drop to right eye      therapeutic multivitamin (THERAGRAN) tablet Take 1 Tab by Mouth Once a Day. multivitamin (ONE A DAY) tablet Take 1 Tab by mouth daily. omega-3 fatty acids-vitamin e 1,000 mg cap Take 1 Cap by mouth. tobramycin-dexamethasone (TOBRADEX) ophthalmic suspension  (Patient not taking: Reported on 2022)      aspirin delayed-release 81 mg tablet Take  by mouth daily.  (Patient not taking: Reported on 2022)       No Known Allergies  Past Medical History:   Diagnosis Date    Atherosclerotic coronary vascular disease     Calculus of kidney     Calculus of ureter     Family history of prostate cancer     Glaucoma     History of radiation therapy     external beam radiation for prostate cancer    Hypercholesterolemia     Hypertension     Malignant neoplasm of prostate ([facility] 2011    MI (myocardial infarction) ([facility]     Myocardial infarction ([facility]     Unspecified hyperplasia of prostate with urinary obstruction and other lower urinary tract symptoms (LUTS)      Past Surgical History:   Procedure Laterality Date    HX HERNIA REPAIR      right inguinal herniorrhapy    [facility] CARDIAC SURG PROCEDURE UNLIST      coronary artery stent placement    NH PROSTATE BIOPSY, NEEDLE, SATURATION SAMPLING       Family History   Problem Relation Age of Onset    Cancer Father         prostate    Cancer Other     Heart Disease Other     Diabetes Other      Social History     Tobacco Use    Smoking status: Former     Years: 2.00     Types: Cigarettes     Quit date: 1961     Years since quittin.2    Smokeless tobacco: Never   Substance Use Topics    Alcohol use: Yes     Comment: socially        Lab Results   Component Value Date/Time    WBC 6.7 2022 09:03 AM    HGB 14.6 2022 09:03 AM    HCT 44.6 2022 09:03 AM    PLATELET 764  09:03 AM    MCV 97.2 2022 09:03 AM     Lab Results   Component Value Date/Time Sodium 141 03/03/2022 09:03 AM    Potassium 4.4 03/03/2022 09:03 AM    Chloride 111 03/03/2022 09:03 AM    CO2 29 03/03/2022 09:03 AM    Anion gap 1 (L) 03/03/2022 09:03 AM    Glucose 97 03/03/2022 09:03 AM    BUN 22 (H) 03/03/2022 09:03 AM    Creatinine 1.13 03/03/2022 09:03 AM    BUN/Creatinine ratio 19 03/03/2022 09:03 AM    GFR est AA >60 03/03/2022 09:03 AM    GFR est non-AA >60 03/03/2022 09:03 AM    Calcium 9.1 03/03/2022 09:03 AM    Bilirubin, total 0.7 03/03/2022 09:03 AM    Alk. phosphatase 60 03/03/2022 09:03 AM    Protein, total 6.4 03/03/2022 09:03 AM    Albumin 3.7 03/03/2022 09:03 AM    Globulin 2.7 03/03/2022 09:03 AM    A-G Ratio 1.4 03/03/2022 09:03 AM    ALT (SGPT) 24 03/03/2022 09:03 AM    AST (SGOT) 21 03/03/2022 09:03 AM     Lab Results   Component Value Date/Time    Cholesterol, total 130 11/16/2021 08:10 AM    HDL Cholesterol 49 11/16/2021 08:10 AM    LDL, calculated 66.2 11/16/2021 08:10 AM    VLDL, calculated 14.8 11/16/2021 08:10 AM    Triglyceride 74 11/16/2021 08:10 AM    CHOL/HDL Ratio 2.7 11/16/2021 08:10 AM     Lab Results   Component Value Date/Time    Prostate Specific Ag 2.4 03/03/2022 09:03 AM    Prostate Specific Ag 2.1 02/04/2019 11:51 AM    Prostate Specific Ag 2.3 02/01/2018 08:55 AM           ROS: see HPI     Physical Exam  Constitutional:       General: He is not in acute distress. Cardiovascular:      Rate and Rhythm: Normal rate and regular rhythm. Heart sounds: Normal heart sounds. Abdominal:      General: Bowel sounds are normal.      Palpations: Abdomen is soft. Tenderness: There is no abdominal tenderness. Musculoskeletal:         General: No swelling. Neurological:      Mental Status: He is oriented to person, place, and time. Psychiatric:         Behavior: Behavior normal.       ASSESSMENT and PLAN    ICD-10-CM ICD-9-CM    1. Pre-operative clearance : cleared for scheduled procedure. Z01.818 V72.84       2.  Atherosclerosis of native coronary artery of native heart without angina pectoris : no anginal symptoms. PCI was done in 2005. For now he has been holding aspirin. Discussed to follow surgeon's/anesthesia recommendations regarding taking pill the day of surgery. I25.10 414.01       3. Essential hypertension, benign : well controlled. Continue current dose of medication and low salt diet. Exercise as tolerated. I10 401.1       4. History of prostate cancer  Z85.46 V10.46     prostate carcinoma, stable status post XRT 2005      5. Nocturia : does not have any urinary burning, urgency . Going on chronically. Does not want to see neurology. Review Dr. Darren Smith note from 2018. Has not seen any one since Dr. Darren Smith. Refused to have a new consultation. R35.1 788.43       Pt understood and agree with the plan     Follow-up and Dispositions    Return in about 6 months (around 3/9/2023). Please note that this dictation was completed with [employer], the computer voice recognition software. Quite often unanticipated grammatical, syntax, homophones, and other interpretive errors are inadvertently transcribed by the computer software. Please disregard these errors. Please excuse any errors that have escaped final proofreading.

## 2022-11-08 ENCOUNTER — OFFICE VISIT (OUTPATIENT)
Dept: CARDIOLOGY CLINIC | Age: 82
End: 2022-11-08
Payer: COMMERCIAL

## 2022-11-08 VITALS
HEIGHT: 74 IN | OXYGEN SATURATION: 98 % | HEART RATE: 55 BPM | WEIGHT: 217 LBS | SYSTOLIC BLOOD PRESSURE: 122 MMHG | BODY MASS INDEX: 27.85 KG/M2 | DIASTOLIC BLOOD PRESSURE: 82 MMHG

## 2022-11-08 DIAGNOSIS — R07.9 CHEST PAIN, UNSPECIFIED TYPE: ICD-10-CM

## 2022-11-08 DIAGNOSIS — I25.10 CORONARY ARTERY DISEASE INVOLVING NATIVE CORONARY ARTERY OF NATIVE HEART WITHOUT ANGINA PECTORIS: Primary | ICD-10-CM

## 2022-11-08 DIAGNOSIS — E78.00 HYPERCHOLESTEREMIA: ICD-10-CM

## 2022-11-08 PROCEDURE — 99214 OFFICE O/P EST MOD 30 MIN: CPT | Performed by: INTERNAL MEDICINE

## 2022-11-08 PROCEDURE — 3078F DIAST BP <80 MM HG: CPT | Performed by: INTERNAL MEDICINE

## 2022-11-08 PROCEDURE — 3074F SYST BP LT 130 MM HG: CPT | Performed by: INTERNAL MEDICINE

## 2022-11-08 PROCEDURE — 1123F ACP DISCUSS/DSCN MKR DOCD: CPT | Performed by: INTERNAL MEDICINE

## 2022-11-08 PROCEDURE — 93000 ELECTROCARDIOGRAM COMPLETE: CPT | Performed by: INTERNAL MEDICINE

## 2022-11-08 NOTE — PROGRESS NOTES
HISTORY OF PRESENT ILLNESS  Shankar Pennington is a 80 y.o. male. ASSESSMENT and PLAN    Mr. Norm Johnson has history of CAD. Back in January 2004, he had a myocardial infarction in [facility], when he presented with nausea and no chest pain. 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 His nuclear scan showed inferior wall scar. His nuclear scan in December 2019 showed EF of 44% with fixed inferobasal and inferolateral defect. No significant reversibility was noted. He has history of dyslipidemia and hypertension. He denies tobacco use. He denies family history of CAD. Unfortunately, he does not have Medicare part B which covers outpatient testing. He has [employer] health insurance. Because his cost would be over $100, he would like to wait. CAD:   Symptomatically stable. As noted previously, he does not wish for surveillance nuclear scan due to his high cost of co-pay. BP:   Well controlled at 122/82. Rhythm:   Asymptomatic sinus bradycardia at 55 bpm.  CHF:    There is no evidence of decompensated CHF noted. Weight:    His weight today is 217 pounds. His baseline weight is 220 pounds. His current weight is acceptable. Cholesterol:   Target LDL <70. Zocor 80. Anti-platelet:   Remains on ASA. I will see him back in 6 months. Thank you. Encounter Diagnoses   Name Primary?     Coronary artery disease involving native coronary artery of native heart without angina pectoris Yes    Chest pain, unspecified type     Hypercholesteremia      current treatment plan is effective, no change in therapy  lab results and schedule of future lab studies reviewed with patient  reviewed diet, exercise and weight control  cardiovascular risk and specific lipid/LDL goals reviewed  use of aspirin to prevent MI and TIA's discussed    Follow-up  Pertinent negatives include no chest pain and no shortness of breath. Today, Mr. Elinor Padilla has no complaints of chest pains, increased shortness of breath or decreased exercise capacity. He denies any orthopnea or PND. He denies any palpitations or dizziness. From cardiac standpoint, he has no complaints. He is in reasonably good spirits. Review of Systems   Respiratory:  Negative for shortness of breath. Cardiovascular:  Negative for chest pain, palpitations, orthopnea, claudication, leg swelling and PND. All other systems reviewed and are negative. Physical Exam  Vitals and nursing note reviewed. Constitutional:       Appearance: Normal appearance. HENT:      Head: Normocephalic. Eyes:      Conjunctiva/sclera: Conjunctivae normal.   Neck:      Vascular: No carotid bruit. Cardiovascular:      Rate and Rhythm: Regular rhythm. Bradycardia present. Pulmonary:      Breath sounds: Normal breath sounds. Abdominal:      Palpations: Abdomen is soft. Musculoskeletal:         General: No swelling. Cervical back: No rigidity. Skin:     General: Skin is dry. Neurological:      General: No focal deficit present. Mental Status: He is alert and oriented to person, place, and time.    Psychiatric:         Mood and Affect: Mood normal.         Behavior: Behavior normal.       PCP: Karly Cordova MD    Past Medical History:   Diagnosis Date    Atherosclerotic coronary vascular disease     Calculus of kidney     Calculus of ureter     Family history of prostate cancer     Glaucoma     History of radiation therapy     external beam radiation for prostate cancer    Hypercholesterolemia     Hypertension     Malignant neoplasm of prostate ([facility] 6/14/2011    MI (myocardial infarction) ([facility]     Myocardial infarction ([facility]     Unspecified hyperplasia of prostate with urinary obstruction and other lower urinary tract symptoms (LUTS)        Past Surgical History:   Procedure Laterality Date    HX HERNIA REPAIR      right inguinal herniorrhapy    SC CARDIAC SURG PROCEDURE UNLIST      coronary artery stent placement    SC PROSTATE BIOPSY, NEEDLE, SATURATION SAMPLING         Current Outpatient Medications   Medication Sig Dispense Refill    metoprolol succinate (TOPROL-XL) 25 mg XL tablet TAKE 1 TABLET BY MOUTH EVERY DAY 90 Tablet 3    simvastatin (ZOCOR) 80 mg tablet TAKE 1 TABLET BY MOUTH EVERY NIGHT 90 Tablet 3    ketorolac (ACULAR) 0.5 % ophthalmic solution Administer 1 Drop to right eye.  fluticasone propionate (FLONASE) 50 mcg/actuation nasal spray 2 Sprays by Both Nostrils route daily.  therapeutic multivitamin (THERAGRAN) tablet Take 1 Tab by Mouth Once a Day.  aspirin delayed-release 81 mg tablet Take  by mouth daily.  omega-3 fatty acids-vitamin e 1,000 mg cap Take 1 Cap by mouth.          The patient has a family history of    Social History     Tobacco Use    Smoking status: Former     Years: 2.00     Types: Cigarettes     Quit date: 1961     Years since quittin.4    Smokeless tobacco: Never   Vaping Use    Vaping Use: Never used   Substance Use Topics    Alcohol use: Yes     Comment: socially    Drug use: Never       Lab Results   Component Value Date/Time    Cholesterol, total 130 2021 08:10 AM    HDL Cholesterol 49 2021 08:10 AM    LDL, calculated 66.2 2021 08:10 AM    Triglyceride 74 2021 08:10 AM    CHOL/HDL Ratio 2.7 2021 08:10 AM        BP Readings from Last 3 Encounters:   22 122/82   22 110/62   05/10/22 138/88        Pulse Readings from Last 3 Encounters:   22 (!) 55   22 63   05/10/22 74       Wt Readings from Last 3 Encounters:   22 98.4 kg (217 lb)   22 98.7 kg (217 lb 9.6 oz)   05/10/22 98.9 kg (218 lb)         EKG: unchanged from previous tracings, normal sinus rhythm, Q waves in leads III and aVF with lateral T wave inversions noted in leads I, aVL, V5 and V6.

## 2022-11-08 NOTE — PROGRESS NOTES
Maliha Renee presents today for   Chief Complaint   Patient presents with    Follow-up     6 month follow up       [place] V[place]ctor preferred language for health care discussion is english/other. Is someone accompanying this pt? no    Is the patient using any DME equipment during [address]? no    Depression Screening:  3 most recent PHQ Screens 11/8/2022   Little interest or pleasure in doing things Not at all   Feeling down, depressed, irritable, or hopeless Not at all   Total Score PHQ 2 0       Learning Assessment:  Learning Assessment 11/8/2022   PRIMARY LEARNER Patient   BARRIERS PRIMARY LEARNER -   CO-LEARNER CAREGIVER -   PRIMARY LANGUAGE ENGLISH   LEARNER PREFERENCE PRIMARY DEMONSTRATION   ANSWERED BY patient   RELATIONSHIP SELF       Abuse Screening:  Abuse Screening Questionnaire 11/8/2022   Do you ever feel afraid of your partner? N   Are you in a relationship with someone who physically or mentally threatens you? N   Is it safe for you to go home? Y       Fall Risk  Fall Risk Assessment, last 12 mths 11/8/2022   Able to walk? Yes   Fall in past 12 months? 0   Do you feel unsteady? 0   Are you worried about falling 0   Is TUG test greater than 12 seconds? -   Is the gait abnormal? -   Number of falls in past 12 months -           Pt currently taking Anticoagulant therapy? no    Pt currently taking Antiplatelet therapy ? ASA 81 mg once a day      Coordination of Care:  1. Have you been to the ER, urgent care clinic since your last visit? Hospitalized since your last visit? no    2. Have you seen or consulted any other health care providers outside of the [address] [place] [address] since your last visit? Include any pap smears or colon screening.  no

## 2023-01-23 ENCOUNTER — OFFICE VISIT (OUTPATIENT)
Dept: FAMILY MEDICINE CLINIC | Age: 83
End: 2023-01-23
Payer: COMMERCIAL

## 2023-01-23 VITALS
HEART RATE: 62 BPM | HEIGHT: 74 IN | DIASTOLIC BLOOD PRESSURE: 66 MMHG | SYSTOLIC BLOOD PRESSURE: 124 MMHG | WEIGHT: 216.8 LBS | RESPIRATION RATE: 16 BRPM | BODY MASS INDEX: 27.82 KG/M2 | TEMPERATURE: 97.4 F | OXYGEN SATURATION: 97 %

## 2023-01-23 DIAGNOSIS — R35.0 URINE FREQUENCY: ICD-10-CM

## 2023-01-23 DIAGNOSIS — Z85.46 HISTORY OF PROSTATE CANCER: ICD-10-CM

## 2023-01-23 DIAGNOSIS — E78.00 PURE HYPERCHOLESTEROLEMIA: Primary | ICD-10-CM

## 2023-01-23 DIAGNOSIS — R26.89 BALANCE PROBLEMS: ICD-10-CM

## 2023-01-23 DIAGNOSIS — R05.8 OTHER COUGH: ICD-10-CM

## 2023-01-23 DIAGNOSIS — K21.9 GASTRIC REFLUX: ICD-10-CM

## 2023-01-23 DIAGNOSIS — I10 ESSENTIAL HYPERTENSION, BENIGN: ICD-10-CM

## 2023-01-23 PROCEDURE — 99213 OFFICE O/P EST LOW 20 MIN: CPT | Performed by: FAMILY MEDICINE

## 2023-01-23 PROCEDURE — 1123F ACP DISCUSS/DSCN MKR DOCD: CPT | Performed by: FAMILY MEDICINE

## 2023-01-23 PROCEDURE — 3074F SYST BP LT 130 MM HG: CPT | Performed by: FAMILY MEDICINE

## 2023-01-23 PROCEDURE — 3078F DIAST BP <80 MM HG: CPT | Performed by: FAMILY MEDICINE

## 2023-01-23 RX ORDER — TAMSULOSIN HYDROCHLORIDE 0.4 MG/1
0.4 CAPSULE ORAL DAILY
Qty: 30 CAPSULE | Refills: 1 | Status: SHIPPED | OUTPATIENT
Start: 2023-01-23

## 2023-01-23 RX ORDER — ALBUTEROL SULFATE 90 UG/1
2 AEROSOL, METERED RESPIRATORY (INHALATION)
Qty: 18 G | Refills: 0 | Status: SHIPPED | OUTPATIENT
Start: 2023-01-23

## 2023-01-23 NOTE — PROGRESS NOTES
1. \"Have you been to the ER, urgent care clinic since your last visit? Hospitalized since your last visit? \" No    2. \"Have you seen or consulted any other health care providers outside of the [address] [place] [address] since your last visit? \" No     3. For patients aged 39-70: Has the patient had a colonoscopy / FIT/ Cologuard?  NA - based on age    Chief Complaint   Patient presents with    Complete Physical     Productive cough xsince last night clear mucus

## 2023-01-24 NOTE — PROGRESS NOTES
HISTORY OF PRESENT ILLNESS  Lety Londono is a 80 y.o. male. HPI:initially was scheduled for physical but was converted to routine visit. H/o prostate cancer. H/o RT . Still having frequency of urination since few years. No burning or urgency. No blood in urine. Not seen urology since few years. Agree to call them now. Mentioned that since yesterday feeling some stuffy nose and cough with sputum. Does not know color. No wheezing. No chest pain or sob. No chest congestion. No fever. Sitting without any acute distress. Taken otc flu and cold medication and not much help. Visit Vitals  /66 (BP 1 Location: Left upper arm, BP Patient Position: Sitting, BP Cuff Size: Adult)   Pulse 62   Temp 97.4 °F (36.3 °C) (Temporal)   Resp 16   Ht 6' 2\" (1.88 m)   Wt 216 lb 12.8 oz (98.3 kg)   SpO2 97%   BMI 27.84 kg/m²     Review medication list, vitals, problem list,allergies.    Lab Results   Component Value Date/Time    WBC 6.7 03/03/2022 09:03 AM    HGB 14.6 03/03/2022 09:03 AM    HCT 44.6 03/03/2022 09:03 AM    PLATELET 028 08/51/7820 09:03 AM    MCV 97.2 03/03/2022 09:03 AM     Lab Results   Component Value Date/Time    Cholesterol, total 130 11/16/2021 08:10 AM    HDL Cholesterol 49 11/16/2021 08:10 AM    LDL, calculated 66.2 11/16/2021 08:10 AM    Triglyceride 74 11/16/2021 08:10 AM    CHOL/HDL Ratio 2.7 11/16/2021 08:10 AM     No results found for: TSH, TSH2, TSH3, TSHP, TSHELE, TSHEXT, TT3, T3U, T3UP, FRT3, FT3, FT4, FT4P, T4, T4P, FT4T, TT7, TSHEXT   Lab Results   Component Value Date/Time    Sodium 141 03/03/2022 09:03 AM    Potassium 4.4 03/03/2022 09:03 AM    Chloride 111 03/03/2022 09:03 AM    CO2 29 03/03/2022 09:03 AM    Anion gap 1 (L) 03/03/2022 09:03 AM    Glucose 97 03/03/2022 09:03 AM    BUN 22 (H) 03/03/2022 09:03 AM    Creatinine 1.13 03/03/2022 09:03 AM    BUN/Creatinine ratio 19 03/03/2022 09:03 AM    GFR est AA >60 03/03/2022 09:03 AM    GFR est non-AA >60 03/03/2022 09:03 AM    Calcium 9.1 03/03/2022 09:03 AM      Lab Results   Component Value Date/Time    Sodium 141 03/03/2022 09:03 AM    Potassium 4.4 03/03/2022 09:03 AM    Chloride 111 03/03/2022 09:03 AM    CO2 29 03/03/2022 09:03 AM    Anion gap 1 (L) 03/03/2022 09:03 AM    Glucose 97 03/03/2022 09:03 AM    BUN 22 (H) 03/03/2022 09:03 AM    Creatinine 1.13 03/03/2022 09:03 AM    BUN/Creatinine ratio 19 03/03/2022 09:03 AM    GFR est AA >60 03/03/2022 09:03 AM    GFR est non-AA >60 03/03/2022 09:03 AM    Calcium 9.1 03/03/2022 09:03 AM    Bilirubin, total 0.7 03/03/2022 09:03 AM    ALT (SGPT) 24 03/03/2022 09:03 AM    Alk. phosphatase 60 03/03/2022 09:03 AM    Protein, total 6.4 03/03/2022 09:03 AM    Albumin 3.7 03/03/2022 09:03 AM    Globulin 2.7 03/03/2022 09:03 AM    A-G Ratio 1.4 03/03/2022 09:03 AM          Lab Results   Component Value Date/Time    Prostate Specific Ag 2.4 03/03/2022 09:03 AM    Prostate Specific Ag 2.1 02/04/2019 11:51 AM    Prostate Specific Ag 2.3 02/01/2018 08:55 AM         ROS: see HPI     Physical Exam  Constitutional:       General: He is not in acute distress. Cardiovascular:      Rate and Rhythm: Normal rate and regular rhythm. Heart sounds: Normal heart sounds. Abdominal:      General: Bowel sounds are normal.      Palpations: Abdomen is soft. Tenderness: There is no abdominal tenderness. Musculoskeletal:         General: No swelling. Cervical back: Neck supple. Neurological:      Mental Status: He is oriented to person, place, and time. Psychiatric:         Behavior: Behavior normal.       ASSESSMENT and PLAN    ICD-10-CM ICD-9-CM    1. Pure hypercholesterolemia: Recheck labs. Diet and lifestyle modification E78.00 272.0 LIPID PANEL      METABOLIC PANEL, COMPREHENSIVE      2. Essential hypertension, benign:well controlled. Continue current dose of medication and low salt diet. Exercise as tolerated. Z13 194.0 METABOLIC PANEL, COMPREHENSIVE      3.  Balance problems: Stable at this time R26.89 781.99       4. Gastric reflux: Asymptomatic K21.9 530.81       5. History of prostate cancer: Agreed to make an appointment with urology Z85.46 V10.46       6. Other cough: Advised symptomatic treatment with the Zyrtec at bedtime. Albuterol as needed. Halls and vicks vaporap  R05.8 786.2 albuterol (PROVENTIL HFA, VENTOLIN HFA, PROAIR HFA) 90 mcg/actuation inhaler      7. Urine frequency: Has been going on since few years. History of prostate cancer. Advised to make an appointment with urology. Meantime starting Flomax R35.0 788.41       Pt understood and agree with the plan     Follow-up and Dispositions    Return in about 3 months (around 4/23/2023). Please note that this dictation was completed with [employer], the [employer] voice recognition software. Quite often unanticipated grammatical, syntax, homophones, and other interpretive errors are inadvertently transcribed by the computer software. Please disregard these errors. Please excuse any errors that have escaped final proofreading.

## 2023-01-25 ENCOUNTER — HOSPITAL ENCOUNTER (OUTPATIENT)
Dept: LAB | Age: 83
Discharge: HOME | End: 2023-01-25
Payer: MEDICARE

## 2023-01-25 DIAGNOSIS — I10 ESSENTIAL HYPERTENSION, BENIGN: ICD-10-CM

## 2023-01-25 DIAGNOSIS — E78.00 PURE HYPERCHOLESTEROLEMIA: ICD-10-CM

## 2023-01-25 LAB
ALBUMIN SERPL-MCNC: 3.9 G/DL (ref 3.4–5)
ALBUMIN/GLOB SERPL: 1.4 (ref 0.8–1.7)
ALP SERPL-CCNC: 61 U/L (ref 45–117)
ALT SERPL-CCNC: 25 U/L (ref 16–61)
ANION GAP SERPL CALC-SCNC: 3 MMOL/L (ref 3–18)
AST SERPL-CCNC: 19 U/L (ref 10–38)
BILIRUB SERPL-MCNC: 0.6 MG/DL (ref 0.2–1)
BUN SERPL-MCNC: 27 MG/DL (ref 7–18)
BUN/CREAT SERPL: 21 (ref 12–20)
CALCIUM SERPL-MCNC: 9.7 MG/DL (ref 8.5–10.1)
CHLORIDE SERPL-SCNC: 111 MMOL/L (ref 100–111)
CHOLEST SERPL-MCNC: 133 MG/DL
CO2 SERPL-SCNC: 27 MMOL/L (ref 21–32)
CREAT SERPL-MCNC: 1.31 MG/DL (ref 0.6–1.3)
GLOBULIN SER CALC-MCNC: 2.8 G/DL (ref 2–4)
GLUCOSE SERPL-MCNC: 102 MG/DL (ref 74–99)
HDLC SERPL-MCNC: 53 MG/DL (ref 40–60)
HDLC SERPL: 2.5 (ref 0–5)
LDLC SERPL CALC-MCNC: 65.4 MG/DL (ref 0–100)
LIPID PROFILE,FLP: NORMAL
POTASSIUM SERPL-SCNC: 4.3 MMOL/L (ref 3.5–5.5)
PROT SERPL-MCNC: 6.7 G/DL (ref 6.4–8.2)
SODIUM SERPL-SCNC: 141 MMOL/L (ref 136–145)
TRIGL SERPL-MCNC: 73 MG/DL (ref ?–150)
VLDLC SERPL CALC-MCNC: 14.6 MG/DL

## 2023-01-25 PROCEDURE — 80053 COMPREHEN METABOLIC PANEL: CPT

## 2023-01-25 PROCEDURE — 80061 LIPID PANEL: CPT

## 2023-01-25 PROCEDURE — 36415 COLL VENOUS BLD VENIPUNCTURE: CPT

## 2023-01-25 NOTE — PROGRESS NOTES
Patient aware that lab results revealed renal insufficiency and mildly elevated credatine.  Mr [facility] was advised per Dr Perez Recinos to Avoid NSAIDS and stay well hydrated further discuss will had at next appt two pt identifiers obtained and verified

## 2023-01-25 NOTE — PROGRESS NOTES
Renal insufficiency. Creatinine mildly elevated compared to before. Avoid NSAIDs. Stay well-hydrated. Other labs fairly stable.   Further discussion on follow-up visit

## 2023-02-20 RX ORDER — TAMSULOSIN HYDROCHLORIDE 0.4 MG/1
CAPSULE ORAL
Qty: 90 CAPSULE | Refills: 0 | Status: SHIPPED | OUTPATIENT
Start: 2023-02-20

## 2023-03-08 RX ORDER — METOPROLOL SUCCINATE 25 MG/1
TABLET, EXTENDED RELEASE ORAL
Qty: 90 TABLET | Refills: 3 | Status: SHIPPED | OUTPATIENT
Start: 2023-03-08

## 2023-03-08 RX ORDER — SIMVASTATIN 80 MG
TABLET ORAL
Qty: 90 TABLET | Refills: 3 | Status: SHIPPED | OUTPATIENT
Start: 2023-03-08

## 2023-05-01 ENCOUNTER — OFFICE VISIT (OUTPATIENT)
Facility: CLINIC | Age: 83
End: 2023-05-01
Payer: COMMERCIAL

## 2023-05-01 ENCOUNTER — HOSPITAL ENCOUNTER (OUTPATIENT)
Facility: HOSPITAL | Age: 83
Setting detail: SPECIMEN
Discharge: HOME | End: 2023-05-04
Payer: COMMERCIAL

## 2023-05-01 VITALS
TEMPERATURE: 98.3 F | HEART RATE: 57 BPM | OXYGEN SATURATION: 96 % | WEIGHT: 225 LBS | RESPIRATION RATE: 16 BRPM | BODY MASS INDEX: 28.89 KG/M2 | SYSTOLIC BLOOD PRESSURE: 132 MMHG | DIASTOLIC BLOOD PRESSURE: 86 MMHG

## 2023-05-01 DIAGNOSIS — I10 ESSENTIAL (PRIMARY) HYPERTENSION: Primary | ICD-10-CM

## 2023-05-01 DIAGNOSIS — R79.89 ELEVATED SERUM CREATININE: ICD-10-CM

## 2023-05-01 DIAGNOSIS — Z12.83 SCREENING FOR SKIN CANCER: ICD-10-CM

## 2023-05-01 DIAGNOSIS — Z85.46 HISTORY OF PROSTATE CANCER: ICD-10-CM

## 2023-05-01 LAB
ANION GAP SERPL CALC-SCNC: 3 MMOL/L (ref 3–18)
BUN SERPL-MCNC: 23 MG/DL (ref 7–18)
BUN/CREAT SERPL: 23 (ref 12–20)
CALCIUM SERPL-MCNC: 9.5 MG/DL (ref 8.5–10.1)
CHLORIDE SERPL-SCNC: 113 MMOL/L (ref 100–111)
CO2 SERPL-SCNC: 26 MMOL/L (ref 21–32)
CREAT SERPL-MCNC: 1.02 MG/DL (ref 0.6–1.3)
ERYTHROCYTE [DISTWIDTH] IN BLOOD BY AUTOMATED COUNT: 12.9 % (ref 11.6–14.5)
GLUCOSE SERPL-MCNC: 117 MG/DL (ref 74–99)
HCT VFR BLD AUTO: 46.9 % (ref 36–48)
HGB BLD-MCNC: 15.4 G/DL (ref 13–16)
MCH RBC QN AUTO: 32.5 PG (ref 24–34)
MCHC RBC AUTO-ENTMCNC: 32.8 G/DL (ref 31–37)
MCV RBC AUTO: 98.9 FL (ref 78–100)
NRBC # BLD: 0 K/UL (ref 0–0.01)
NRBC BLD-RTO: 0 PER 100 WBC
PLATELET # BLD AUTO: 162 K/UL (ref 135–420)
PMV BLD AUTO: 11.4 FL (ref 9.2–11.8)
POTASSIUM SERPL-SCNC: 4.4 MMOL/L (ref 3.5–5.5)
PSA SERPL-MCNC: 2.5 NG/ML (ref 0–4)
RBC # BLD AUTO: 4.74 M/UL (ref 4.35–5.65)
SODIUM SERPL-SCNC: 142 MMOL/L (ref 136–145)
WBC # BLD AUTO: 7.6 K/UL (ref 4.6–13.2)

## 2023-05-01 PROCEDURE — 36415 COLL VENOUS BLD VENIPUNCTURE: CPT

## 2023-05-01 PROCEDURE — 3075F SYST BP GE 130 - 139MM HG: CPT | Performed by: STUDENT IN AN ORGANIZED HEALTH CARE EDUCATION/TRAINING PROGRAM

## 2023-05-01 PROCEDURE — 1123F ACP DISCUSS/DSCN MKR DOCD: CPT | Performed by: STUDENT IN AN ORGANIZED HEALTH CARE EDUCATION/TRAINING PROGRAM

## 2023-05-01 PROCEDURE — 99214 OFFICE O/P EST MOD 30 MIN: CPT | Performed by: STUDENT IN AN ORGANIZED HEALTH CARE EDUCATION/TRAINING PROGRAM

## 2023-05-01 PROCEDURE — 3079F DIAST BP 80-89 MM HG: CPT | Performed by: STUDENT IN AN ORGANIZED HEALTH CARE EDUCATION/TRAINING PROGRAM

## 2023-05-01 PROCEDURE — 84153 ASSAY OF PSA TOTAL: CPT

## 2023-05-01 PROCEDURE — 85027 COMPLETE CBC AUTOMATED: CPT

## 2023-05-01 PROCEDURE — 80048 BASIC METABOLIC PNL TOTAL CA: CPT

## 2023-05-01 SDOH — ECONOMIC STABILITY: HOUSING INSECURITY
IN THE LAST 12 MONTHS, WAS THERE A TIME WHEN YOU DID NOT HAVE A STEADY PLACE TO SLEEP OR SLEPT IN A SHELTER (INCLUDING NOW)?: NO

## 2023-05-01 SDOH — ECONOMIC STABILITY: INCOME INSECURITY: HOW HARD IS IT FOR YOU TO PAY FOR THE VERY BASICS LIKE FOOD, HOUSING, MEDICAL CARE, AND HEATING?: NOT HARD AT ALL

## 2023-05-01 SDOH — ECONOMIC STABILITY: FOOD INSECURITY: WITHIN THE PAST 12 MONTHS, YOU WORRIED THAT YOUR FOOD WOULD RUN OUT BEFORE YOU GOT MONEY TO BUY MORE.: NEVER TRUE

## 2023-05-01 SDOH — ECONOMIC STABILITY: FOOD INSECURITY: WITHIN THE PAST 12 MONTHS, THE FOOD YOU BOUGHT JUST DIDN'T LAST AND YOU DIDN'T HAVE MONEY TO GET MORE.: NEVER TRUE

## 2023-05-01 NOTE — PROGRESS NOTES
Jo Hernandez (: 1940) is a 80 y.o. male, established patient, here for evaluation of the following chief complaint(s):  Lesion(s) (lesion was on right arm but now has gone away. Patient would like to get a check up because he has not seen a skin doctor in about 5 years.)       Assessment/Plan:  1. Essential (primary) hypertension-screening blood work today. Continue current dose of metoprolol.  -     CBC; Future  2. Screening for skin cancer-previously saw [facility]. Recommend return to their office for skin evaluation and elective removal of seborrheic keratosis per patient's request.  -     External Referral To Dermatology  3. History of prostate cancer-screening PSA today. Last visit, Dr. Raul Hartman recommended he return to urology. That recommendation stands. -     PSA Screening; Future  4. Elevated serum creatinine-new elevation of creatinine 4 months ago with routine blood work. We will reevaluate this today. -     Basic Metabolic Panel; Future      Return in about 6 months (around 2023) for with Dr. Raul Hartman. Subjective/Objective:  HPI    Skin lesion-patient states he noted a black lesion on his right forearm for several weeks. He tried to smudge it off but it would not budge. Then about a week later it changed in character. However now he is unable to find it.  -Of note patient has significant vision difficulties which made it hard to evaluate the lesion. History of Prostate Cancer- Last saw about 3 yrs ago. Last PSA 2.4-15 months ago. Physical Exam  Blood pressure 132/86, pulse 57, temperature 98.3 °F (36.8 °C), temperature source Tympanic, resp. rate 16, weight 225 lb (102.1 kg), SpO2 96 %. Body mass index is 28.89 kg/m². General appearance - Alert, NAD, normal appearance  Head - Atraumatic. Normocephalic. No lymphadenopathy  Eyes - EOMI. Sclera white. Ears - Hearing grossly normal. TMs without erythema or bulge bilaterally. No cerumen impaction.    Nose - Nares

## 2023-05-01 NOTE — PROGRESS NOTES
Chief Complaint   Patient presents with    Lesion(s)     lesion was on right arm but now has gone away. Patient would like to get a check up because he has not seen a skin doctor in about 5 years. 1. \"Have you been to the ER, urgent care clinic since your last visit? Hospitalized since your last visit? \" No    2. \"Have you seen or consulted any other health care providers outside of the [address] [place] [address] since your last visit? \" No     3. For patients aged 39-70: Has the patient had a colonoscopy / FIT/ Cologuard? NA - based on age      If the patient is female:    4. For patients aged 41-77: Has the patient had a mammogram within the past 2 years? NA - based on age or sex      11. For patients aged 21-65: Has the patient had a pap smear?  NA - based on age or sex

## 2023-05-02 NOTE — RESULT ENCOUNTER NOTE
Creatinine has returned to baseline from elevation 3 months ago. Ensure good hydration. Stable PSA over last yr (2.4 -> 2.5). Otherwise no acute findings.

## 2023-05-09 ENCOUNTER — OFFICE VISIT (OUTPATIENT)
Age: 83
End: 2023-05-09
Payer: COMMERCIAL

## 2023-05-09 VITALS
HEART RATE: 60 BPM | WEIGHT: 218 LBS | HEIGHT: 74 IN | SYSTOLIC BLOOD PRESSURE: 122 MMHG | DIASTOLIC BLOOD PRESSURE: 82 MMHG | BODY MASS INDEX: 27.98 KG/M2 | OXYGEN SATURATION: 97 %

## 2023-05-09 DIAGNOSIS — R07.9 CHEST PAIN, UNSPECIFIED TYPE: ICD-10-CM

## 2023-05-09 DIAGNOSIS — E78.00 PURE HYPERCHOLESTEROLEMIA, UNSPECIFIED: ICD-10-CM

## 2023-05-09 DIAGNOSIS — I25.10 ATHEROSCLEROSIS OF NATIVE CORONARY ARTERY WITHOUT ANGINA PECTORIS, UNSPECIFIED WHETHER NATIVE OR TRANSPLANTED HEART: Primary | ICD-10-CM

## 2023-05-09 PROCEDURE — 99214 OFFICE O/P EST MOD 30 MIN: CPT | Performed by: INTERNAL MEDICINE

## 2023-05-09 PROCEDURE — 3074F SYST BP LT 130 MM HG: CPT | Performed by: INTERNAL MEDICINE

## 2023-05-09 PROCEDURE — 1123F ACP DISCUSS/DSCN MKR DOCD: CPT | Performed by: INTERNAL MEDICINE

## 2023-05-09 PROCEDURE — 93000 ELECTROCARDIOGRAM COMPLETE: CPT | Performed by: INTERNAL MEDICINE

## 2023-05-09 PROCEDURE — 3079F DIAST BP 80-89 MM HG: CPT | Performed by: INTERNAL MEDICINE

## 2023-05-09 ASSESSMENT — PATIENT HEALTH QUESTIONNAIRE - PHQ9
2. FEELING DOWN, DEPRESSED OR HOPELESS: 0
SUM OF ALL RESPONSES TO PHQ QUESTIONS 1-9: 0
1. LITTLE INTEREST OR PLEASURE IN DOING THINGS: 0
SUM OF ALL RESPONSES TO PHQ QUESTIONS 1-9: 0
SUM OF ALL RESPONSES TO PHQ9 QUESTIONS 1 & 2: 0
SUM OF ALL RESPONSES TO PHQ QUESTIONS 1-9: 0
SUM OF ALL RESPONSES TO PHQ QUESTIONS 1-9: 0

## 2023-05-09 NOTE — PROGRESS NOTES
Criselda Kee presents today for   Chief Complaint   Patient presents with    6 Month Follow-Up     6 month f/u with no concerns       Criseldaabel GallegosVidhya preferred language for health care discussion is english/other. Is someone accompanying this pt? no    Is the patient using any DME equipment during OV? no    Depression Screening:  Depression: Not at risk    PHQ-2 Score: 0        Learning Assessment:  Who is the primary learner? Patient    What is the preferred language for health care of the primary learner? ENGLISH    How does the primary learner prefer to learn new concepts? DEMONSTRATION    Answered By patient    Relationship to Learner SELF           Pt currently taking Anticoagulant therapy? no    Pt currently taking Antiplatelet therapy ? ASA 81 MG 1X DAILY      Coordination of Care:  1. Have you been to the ER, urgent care clinic since your last visit? Hospitalized since your last visit? no    2. Have you seen or consulted any other health care providers outside of the [address] [place] [address] since your last visit? Include any pap smears or colon screening.  no

## 2023-05-09 NOTE — PROGRESS NOTES
HISTORY OF PRESENT ILLNESS  Kelley Angel  80 y.o. male     Chief Complaint   Patient presents with    6 Month Follow-Up     6 month f/u with no concerns       ASSESSMENT and PLAN    The primary encounter diagnosis was Atherosclerosis of native coronary artery without angina pectoris, unspecified whether native or transplanted heart. Diagnoses of Chest pain, unspecified type and Pure hypercholesterolemia, unspecified were also pertinent to this visit. Mr. Jhon Santoro has history of CAD. Back in January 2004, he had a myocardial infarction in [facility], when he presented with nausea and no chest pain. 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 His nuclear scan showed inferior wall scar. His nuclear scan in December 2019 showed EF of 44% with fixed inferobasal and inferolateral defect. No significant reversibility was noted. He has history of dyslipidemia and hypertension. He denies tobacco use. He denies family history of CAD. Unfortunately, he does not have Medicare part B which covers outpatient testing. He has [employer] health insurance. Because his cost would be over $100, he would like to wait. CAD:    Symptomatically stable. BP:    Well controlled at 122/82. Rhythm:    Stable sinus at 60 bpm.  CHF:    There is no evidence of decompensated CHF noted. Weight:     His weight today is 218 pounds. This is near his baseline weight of 220 pounds. Cholesterol:   Target LDL <70. Zocor 80. Anti-platelet:   Remains on ASA. I will see him back in 6 months. Thank you. Orders Placed This Encounter   Procedures    EKG 12 Lead     Order Specific Question:   Reason for Exam?     Answer: Other        HPI  Today, Mr. Lorie Nunn has no complaint of chest pains, increased shortness of breath or decreased exercise capacity. He denies any changes in his activities.   Because of his

## 2023-08-22 NOTE — PROGRESS NOTES
1. \"Have you been to the ER, urgent care clinic since your last visit? Hospitalized since your last visit? \" No    2. \"Have you seen or consulted any other health care providers outside of the [address] [place] [address] since your last visit? \" No     3. For patients aged 43-73: Has the patient had a colonoscopy / FIT/ Cologuard?  NA - based on age    Chief Complaint   Patient presents with    Hypertension    Cholesterol Problem    Other     Elevated serum creatinine and balance problem follow-up

## 2023-08-23 ENCOUNTER — OFFICE VISIT (OUTPATIENT)
Age: 83
End: 2023-08-23
Payer: COMMERCIAL

## 2023-08-23 VITALS
RESPIRATION RATE: 16 BRPM | DIASTOLIC BLOOD PRESSURE: 68 MMHG | OXYGEN SATURATION: 97 % | BODY MASS INDEX: 27.95 KG/M2 | HEIGHT: 74 IN | WEIGHT: 217.8 LBS | SYSTOLIC BLOOD PRESSURE: 126 MMHG | TEMPERATURE: 96.9 F | HEART RATE: 53 BPM

## 2023-08-23 DIAGNOSIS — K21.9 GASTRIC REFLUX: ICD-10-CM

## 2023-08-23 DIAGNOSIS — I25.10 ATHEROSCLEROSIS OF NATIVE CORONARY ARTERY OF NATIVE HEART WITHOUT ANGINA PECTORIS: ICD-10-CM

## 2023-08-23 DIAGNOSIS — Z85.46 HISTORY OF PROSTATE CANCER: ICD-10-CM

## 2023-08-23 DIAGNOSIS — R35.0 URINARY FREQUENCY: ICD-10-CM

## 2023-08-23 DIAGNOSIS — I10 ESSENTIAL HYPERTENSION, BENIGN: Primary | ICD-10-CM

## 2023-08-23 DIAGNOSIS — E78.00 PURE HYPERCHOLESTEROLEMIA: ICD-10-CM

## 2023-08-23 PROCEDURE — 99212 OFFICE O/P EST SF 10 MIN: CPT | Performed by: FAMILY MEDICINE

## 2023-08-23 PROCEDURE — 1123F ACP DISCUSS/DSCN MKR DOCD: CPT | Performed by: FAMILY MEDICINE

## 2023-08-23 PROCEDURE — 3074F SYST BP LT 130 MM HG: CPT | Performed by: FAMILY MEDICINE

## 2023-08-23 PROCEDURE — 3078F DIAST BP <80 MM HG: CPT | Performed by: FAMILY MEDICINE

## 2023-08-23 RX ORDER — ELECTROLYTES/DEXTROSE
1 SOLUTION, ORAL ORAL DAILY
COMMUNITY

## 2023-08-23 ASSESSMENT — PATIENT HEALTH QUESTIONNAIRE - PHQ9
2. FEELING DOWN, DEPRESSED OR HOPELESS: 0
SUM OF ALL RESPONSES TO PHQ QUESTIONS 1-9: 0
SUM OF ALL RESPONSES TO PHQ9 QUESTIONS 1 & 2: 0
SUM OF ALL RESPONSES TO PHQ QUESTIONS 1-9: 0
1. LITTLE INTEREST OR PLEASURE IN DOING THINGS: 0
SUM OF ALL RESPONSES TO PHQ QUESTIONS 1-9: 0
SUM OF ALL RESPONSES TO PHQ QUESTIONS 1-9: 0

## 2023-08-23 NOTE — PROGRESS NOTES
Ya Yuen is a 80 y.o. male complains of   Chief Complaint   Patient presents with    Hypertension    Cholesterol Problem    Other     Elevated serum creatinine and balance problem follow-up       HPI: Here for routine follow-up. Also history of prostate cancer. On and off urine frequency. Off Flomax as it is not much helpful. No burning urination. Will observe. Denies any UTI symptoms. Sitting without any acute distress  Coronary artery disease. On statin management. No anginal symptoms. Following cardiology and the recommendation. Hypertension, hyperlipidemia. Working on lifestyle modification. Had balance concerns which has been improved at this time. Encouraged him to walk with cane to prevent fall. No headache or dizziness. No chest pain or shortness of breath. No palpitation or diaphoresis. No nausea vomiting or abdominal pain. No bowel complaint. No cold cough or wheezing.     CMP:  Lab Results   Component Value Date/Time     05/01/2023 01:47 PM    K 4.4 05/01/2023 01:47 PM     05/01/2023 01:47 PM    CO2 26 05/01/2023 01:47 PM    BUN 23 05/01/2023 01:47 PM    CREATININE 1.02 05/01/2023 01:47 PM    GLUCOSE 117 05/01/2023 01:47 PM    CALCIUM 9.5 05/01/2023 01:47 PM    PROT 6.7 01/25/2023 09:04 AM    LABALBU 3.9 01/25/2023 09:04 AM    BILITOT 0.6 01/25/2023 09:04 AM    AST 19 01/25/2023 09:04 AM    ALT 25 01/25/2023 09:04 AM        POC Glucose: No results found for: POCGLU     CBC:  Lab Results   Component Value Date/Time    WBC 7.6 05/01/2023 01:47 PM    RBC 4.74 05/01/2023 01:47 PM    HGB 15.4 05/01/2023 01:47 PM    HCT 46.9 05/01/2023 01:47 PM    MCV 98.9 05/01/2023 01:47 PM    MCH 32.5 05/01/2023 01:47 PM    MCHC 32.8 05/01/2023 01:47 PM    RDW 12.9 05/01/2023 01:47 PM     05/01/2023 01:47 PM    MPV 11.4 05/01/2023 01:47 PM        Lipids   Lab Results   Component Value Date/Time    CHOL 133 01/25/2023 09:04 AM    TRIG 73 01/25/2023 09:04 AM    LDLCALC 65.4

## 2023-12-04 RX ORDER — SIMVASTATIN 80 MG
TABLET ORAL
Qty: 90 TABLET | Refills: 3 | Status: SHIPPED | OUTPATIENT
Start: 2023-12-04

## 2024-02-07 ENCOUNTER — OFFICE VISIT (OUTPATIENT)
Age: 84
End: 2024-02-07
Payer: COMMERCIAL

## 2024-02-07 VITALS
SYSTOLIC BLOOD PRESSURE: 126 MMHG | WEIGHT: 226 LBS | HEIGHT: 74 IN | OXYGEN SATURATION: 97 % | DIASTOLIC BLOOD PRESSURE: 72 MMHG | HEART RATE: 66 BPM | BODY MASS INDEX: 29 KG/M2

## 2024-02-07 DIAGNOSIS — I25.10 ATHEROSCLEROSIS OF NATIVE CORONARY ARTERY OF NATIVE HEART WITHOUT ANGINA PECTORIS: Primary | ICD-10-CM

## 2024-02-07 DIAGNOSIS — I25.10 ATHEROSCLEROSIS OF NATIVE CORONARY ARTERY WITHOUT ANGINA PECTORIS, UNSPECIFIED WHETHER NATIVE OR TRANSPLANTED HEART: ICD-10-CM

## 2024-02-07 DIAGNOSIS — E78.00 PURE HYPERCHOLESTEROLEMIA, UNSPECIFIED: ICD-10-CM

## 2024-02-07 DIAGNOSIS — R07.9 CHEST PAIN, UNSPECIFIED TYPE: ICD-10-CM

## 2024-02-07 PROCEDURE — 3074F SYST BP LT 130 MM HG: CPT | Performed by: INTERNAL MEDICINE

## 2024-02-07 PROCEDURE — 1123F ACP DISCUSS/DSCN MKR DOCD: CPT | Performed by: INTERNAL MEDICINE

## 2024-02-07 PROCEDURE — 93000 ELECTROCARDIOGRAM COMPLETE: CPT | Performed by: INTERNAL MEDICINE

## 2024-02-07 PROCEDURE — 99214 OFFICE O/P EST MOD 30 MIN: CPT | Performed by: INTERNAL MEDICINE

## 2024-02-07 PROCEDURE — 3078F DIAST BP <80 MM HG: CPT | Performed by: INTERNAL MEDICINE

## 2024-02-07 ASSESSMENT — PATIENT HEALTH QUESTIONNAIRE - PHQ9
1. LITTLE INTEREST OR PLEASURE IN DOING THINGS: 0
SUM OF ALL RESPONSES TO PHQ9 QUESTIONS 1 & 2: 0
SUM OF ALL RESPONSES TO PHQ QUESTIONS 1-9: 0
SUM OF ALL RESPONSES TO PHQ QUESTIONS 1-9: 0
2. FEELING DOWN, DEPRESSED OR HOPELESS: 0
SUM OF ALL RESPONSES TO PHQ QUESTIONS 1-9: 0
SUM OF ALL RESPONSES TO PHQ QUESTIONS 1-9: 0

## 2024-02-07 NOTE — PROGRESS NOTES
Cole Ellington presents today for   Chief Complaint   Patient presents with    Follow-up     6 months       Cole Ellington preferred language for health care discussion is english/other.    Is someone accompanying this pt? no    Is the patient using any DME equipment during OV? no    Depression Screening:  Depression: Not at risk (2/7/2024)    PHQ-2     PHQ-2 Score: 0        Learning Assessment:  Who is the primary learner? Patient    What is the preferred language for health care of the primary learner? ENGLISH    How does the primary learner prefer to learn new concepts? DEMONSTRATION    Answered By patient    Relationship to Learner SELF           Pt currently taking Anticoagulant therapy? no    Pt currently taking Antiplatelet therapy ? aspirin      Coordination of Care:  1. Have you been to the ER, urgent care clinic since your last visit? Hospitalized since your last visit? no    2. Have you seen or consulted any other health care providers outside of the [facility] since your last visit? Include any pap smears or colon screening. no    
simvastatin (ZOCOR) 80 MG tablet TAKE 1 TABLET BY MOUTH EVERY NIGHT 90 tablet 3    Omega-3 Fatty Acids (OMEGA-3 FISH OIL PO) Take 1 capsule by mouth daily      Omega-3 Fatty Acids (OMEGA-3 FISH OIL PO) Take 1,000 mg by mouth      Multiple Vitamin (MULTIVITAMIN ADULT) TABS Take 1 tablet by mouth daily      metoprolol succinate (TOPROL XL) 25 MG extended release tablet TAKE 1 TABLET BY MOUTH EVERY DAY 90 tablet 3    aspirin 81 MG EC tablet Take by mouth daily      fluticasone (FLONASE) 50 MCG/ACT nasal spray 2 sprays by Nasal route as needed      ketorolac (ACULAR) 0.5 % ophthalmic solution Apply 1 drop to eye       No current facility-administered medications for this visit.       The patient has a family history of    Social History     Tobacco Use    Smoking status: Former     Current packs/day: 0.00     Types: Cigarettes     Quit date: 1961     Years since quittin.6    Smokeless tobacco: Never   Substance Use Topics    Alcohol use: Yes    Drug use: Never       Lab Results   Component Value Date/Time    CHOL 133 2023 09:04 AM    HDL 53 2023 09:04 AM        BP Readings from Last 3 Encounters:   24 126/72   23 126/68   23 122/82        Pulse Readings from Last 3 Encounters:   24 66   23 53   23 60       Wt Readings from Last 3 Encounters:   24 102.5 kg (226 lb)   23 98.8 kg (217 lb 12.8 oz)   23 98.9 kg (218 lb)         EKG: normal sinus rhythm, occasional PVC noted, unifocal, Q waves in leads III and aVF with T wave inversions inferolaterally in leads II, III, aVF, V5 and V6, unchanged from previous tracings.     Geovanny Pearson MD   2024

## 2024-02-22 ENCOUNTER — OFFICE VISIT (OUTPATIENT)
Age: 84
End: 2024-02-22
Payer: COMMERCIAL

## 2024-02-22 VITALS
OXYGEN SATURATION: 96 % | HEIGHT: 74 IN | HEART RATE: 61 BPM | BODY MASS INDEX: 28.18 KG/M2 | DIASTOLIC BLOOD PRESSURE: 60 MMHG | RESPIRATION RATE: 16 BRPM | TEMPERATURE: 97.2 F | WEIGHT: 219.6 LBS | SYSTOLIC BLOOD PRESSURE: 102 MMHG

## 2024-02-22 DIAGNOSIS — N39.43 POST-VOID DRIBBLING: ICD-10-CM

## 2024-02-22 DIAGNOSIS — I25.10 CORONARY ARTERY DISEASE INVOLVING NATIVE CORONARY ARTERY OF NATIVE HEART WITHOUT ANGINA PECTORIS: ICD-10-CM

## 2024-02-22 DIAGNOSIS — Z85.46 HISTORY OF PROSTATE CANCER: ICD-10-CM

## 2024-02-22 DIAGNOSIS — K21.9 GASTRIC REFLUX: ICD-10-CM

## 2024-02-22 DIAGNOSIS — I10 ESSENTIAL HYPERTENSION, BENIGN: Primary | ICD-10-CM

## 2024-02-22 DIAGNOSIS — E78.00 PURE HYPERCHOLESTEROLEMIA: ICD-10-CM

## 2024-02-22 PROCEDURE — 1123F ACP DISCUSS/DSCN MKR DOCD: CPT | Performed by: FAMILY MEDICINE

## 2024-02-22 PROCEDURE — 99214 OFFICE O/P EST MOD 30 MIN: CPT | Performed by: FAMILY MEDICINE

## 2024-02-22 PROCEDURE — 3074F SYST BP LT 130 MM HG: CPT | Performed by: FAMILY MEDICINE

## 2024-02-22 PROCEDURE — 3078F DIAST BP <80 MM HG: CPT | Performed by: FAMILY MEDICINE

## 2024-02-22 RX ORDER — TIMOLOL MALEATE 5 MG/ML
SOLUTION OPHTHALMIC
COMMUNITY
Start: 2024-02-06

## 2024-02-22 RX ORDER — KETOROLAC TROMETHAMINE 4 MG/ML
SOLUTION/ DROPS OPHTHALMIC
COMMUNITY
Start: 2024-01-31

## 2024-02-22 SDOH — ECONOMIC STABILITY: FOOD INSECURITY: WITHIN THE PAST 12 MONTHS, YOU WORRIED THAT YOUR FOOD WOULD RUN OUT BEFORE YOU GOT MONEY TO BUY MORE.: NEVER TRUE

## 2024-02-22 SDOH — ECONOMIC STABILITY: FOOD INSECURITY: WITHIN THE PAST 12 MONTHS, THE FOOD YOU BOUGHT JUST DIDN'T LAST AND YOU DIDN'T HAVE MONEY TO GET MORE.: NEVER TRUE

## 2024-02-22 SDOH — ECONOMIC STABILITY: INCOME INSECURITY: HOW HARD IS IT FOR YOU TO PAY FOR THE VERY BASICS LIKE FOOD, HOUSING, MEDICAL CARE, AND HEATING?: NOT HARD AT ALL

## 2024-02-22 ASSESSMENT — PATIENT HEALTH QUESTIONNAIRE - PHQ9
SUM OF ALL RESPONSES TO PHQ QUESTIONS 1-9: 0
SUM OF ALL RESPONSES TO PHQ QUESTIONS 1-9: 0
1. LITTLE INTEREST OR PLEASURE IN DOING THINGS: 0
SUM OF ALL RESPONSES TO PHQ9 QUESTIONS 1 & 2: 0
SUM OF ALL RESPONSES TO PHQ QUESTIONS 1-9: 0
2. FEELING DOWN, DEPRESSED OR HOPELESS: 0
SUM OF ALL RESPONSES TO PHQ QUESTIONS 1-9: 0

## 2024-02-22 NOTE — PROGRESS NOTES
1. \"Have you been to the ER, urgent care clinic since your last visit?  Hospitalized since your last visit?\" No    2. \"Have you seen or consulted any other health care providers outside of the [facility] since your last visit?\" No     3. For patients aged 45-75: Has the patient had a colonoscopy / FIT/ Cologuard? NA - based on age    Chief Complaint   Patient presents with    Hypertension    Cholesterol Problem    gastric reflux             
normal. No respiratory distress.      Breath sounds: No wheezing.   Abdominal:      Palpations: Abdomen is soft.      Tenderness: There is no abdominal tenderness.   Musculoskeletal:         General: No swelling.      Cervical back: Neck supple.   Neurological:      Mental Status: He is alert and oriented to person, place, and time.   Psychiatric:         Behavior: Behavior normal.            ASSESSMENT/ PLAN:    Diagnoses and all orders for this visit:  Essential hypertension, benign: well controlled. Continue current dose of medication and low salt diet. Exercise as tolerated.    Pure hypercholesterolemia: On statin.  On aspirin.  Following cardiology and the recommendation.  Tolerating medication well.  Continue diet and lifestyle modification  -     Comprehensive Metabolic Panel; Future  -     Lipid Panel; Future  Gastric reflux: On and off symptoms.  Diet modification.  Taking Tums as needed.  Comments:  taking tums on and off. does not want PPI  Orders:  -     CBC; Future  Coronary artery disease involving native coronary artery of native heart without angina pectoris: Following cardiology.  No anginal symptoms.  On risk factor management.  Will follow and observe  Comments:  following cardiology. on statin and aspirin  History of prostate cancer  -     [facility]  Post-void dribbling: No urinary burning or urgency.  History of prostate cancer.  Agreed to see urology again.  Done a referral  -     Urology [facility]     Patient understood agree with the plan  Due immunization from the pharmacy if he decides to    Return in about 6 months (around 8/22/2024).     Orders Placed This Encounter   Procedures    Comprehensive Metabolic Panel    CBC    Lipid Panel    [facility]   Stephanie Armendariz MD  Please note that this dictation was completed with [employer], the computer voice recognition software.  Quite often unanticipated grammatical, syntax, homophones, and other

## 2024-03-04 RX ORDER — METOPROLOL SUCCINATE 25 MG/1
TABLET, EXTENDED RELEASE ORAL
Qty: 90 TABLET | Refills: 3 | Status: SHIPPED | OUTPATIENT
Start: 2024-03-04

## 2024-08-13 ENCOUNTER — HOSPITAL ENCOUNTER (OUTPATIENT)
Facility: HOSPITAL | Age: 84
Discharge: HOME | End: 2024-08-16
Payer: MEDICARE

## 2024-08-13 DIAGNOSIS — E78.00 PURE HYPERCHOLESTEROLEMIA: ICD-10-CM

## 2024-08-13 DIAGNOSIS — K21.9 GASTRIC REFLUX: ICD-10-CM

## 2024-08-13 LAB
ALBUMIN SERPL-MCNC: 3.8 G/DL (ref 3.4–5)
ALBUMIN/GLOB SERPL: 1.5 (ref 0.8–1.7)
ALP SERPL-CCNC: 58 U/L (ref 45–117)
ALT SERPL-CCNC: 22 U/L (ref 16–61)
ANION GAP SERPL CALC-SCNC: 8 MMOL/L (ref 3–18)
AST SERPL-CCNC: 24 U/L (ref 10–38)
BILIRUB SERPL-MCNC: 0.9 MG/DL (ref 0.2–1)
BUN SERPL-MCNC: 21 MG/DL (ref 7–18)
BUN/CREAT SERPL: 19 (ref 12–20)
CALCIUM SERPL-MCNC: 9.1 MG/DL (ref 8.5–10.1)
CHLORIDE SERPL-SCNC: 107 MMOL/L (ref 100–111)
CHOLEST SERPL-MCNC: 126 MG/DL
CO2 SERPL-SCNC: 25 MMOL/L (ref 21–32)
CREAT SERPL-MCNC: 1.11 MG/DL (ref 0.6–1.3)
ERYTHROCYTE [DISTWIDTH] IN BLOOD BY AUTOMATED COUNT: 13 % (ref 11.6–14.5)
GLOBULIN SER CALC-MCNC: 2.6 G/DL (ref 2–4)
GLUCOSE SERPL-MCNC: 82 MG/DL (ref 74–99)
HCT VFR BLD AUTO: 48.2 % (ref 36–48)
HDLC SERPL-MCNC: 54 MG/DL (ref 40–60)
HDLC SERPL: 2.3 (ref 0–5)
HGB BLD-MCNC: 16.3 G/DL (ref 13–16)
LDLC SERPL CALC-MCNC: 60.4 MG/DL (ref 0–100)
LIPID PANEL: NORMAL
MCH RBC QN AUTO: 33.8 PG (ref 24–34)
MCHC RBC AUTO-ENTMCNC: 33.8 G/DL (ref 31–37)
MCV RBC AUTO: 100 FL (ref 78–100)
NRBC # BLD: 0 K/UL (ref 0–0.01)
NRBC BLD-RTO: 0 PER 100 WBC
PLATELET # BLD AUTO: 158 K/UL (ref 135–420)
PMV BLD AUTO: 11.5 FL (ref 9.2–11.8)
POTASSIUM SERPL-SCNC: 4.3 MMOL/L (ref 3.5–5.5)
PROT SERPL-MCNC: 6.4 G/DL (ref 6.4–8.2)
RBC # BLD AUTO: 4.82 M/UL (ref 4.35–5.65)
SODIUM SERPL-SCNC: 140 MMOL/L (ref 136–145)
TRIGL SERPL-MCNC: 58 MG/DL
VLDLC SERPL CALC-MCNC: 11.6 MG/DL
WBC # BLD AUTO: 7.2 K/UL (ref 4.6–13.2)

## 2024-08-13 PROCEDURE — 36415 COLL VENOUS BLD VENIPUNCTURE: CPT

## 2024-08-13 PROCEDURE — 80053 COMPREHEN METABOLIC PANEL: CPT

## 2024-08-13 PROCEDURE — 85027 COMPLETE CBC AUTOMATED: CPT

## 2024-08-13 PROCEDURE — 80061 LIPID PANEL: CPT

## 2024-08-22 ENCOUNTER — OFFICE VISIT (OUTPATIENT)
Facility: CLINIC | Age: 84
End: 2024-08-22
Payer: COMMERCIAL

## 2024-08-22 VITALS
HEIGHT: 74 IN | TEMPERATURE: 97.4 F | WEIGHT: 219.6 LBS | SYSTOLIC BLOOD PRESSURE: 126 MMHG | BODY MASS INDEX: 28.18 KG/M2 | OXYGEN SATURATION: 100 % | HEART RATE: 58 BPM | RESPIRATION RATE: 16 BRPM | DIASTOLIC BLOOD PRESSURE: 62 MMHG

## 2024-08-22 DIAGNOSIS — N39.43 POST-VOID DRIBBLING: ICD-10-CM

## 2024-08-22 DIAGNOSIS — D58.2 ELEVATED HEMOGLOBIN (HCC): ICD-10-CM

## 2024-08-22 DIAGNOSIS — I10 ESSENTIAL HYPERTENSION, BENIGN: ICD-10-CM

## 2024-08-22 DIAGNOSIS — E78.00 PURE HYPERCHOLESTEROLEMIA: ICD-10-CM

## 2024-08-22 DIAGNOSIS — Z85.46 HISTORY OF PROSTATE CANCER: ICD-10-CM

## 2024-08-22 DIAGNOSIS — K21.9 GASTRIC REFLUX: ICD-10-CM

## 2024-08-22 DIAGNOSIS — I50.22 CHRONIC SYSTOLIC (CONGESTIVE) HEART FAILURE (HCC): ICD-10-CM

## 2024-08-22 DIAGNOSIS — G47.9 SLEEP TROUBLE: Primary | ICD-10-CM

## 2024-08-22 DIAGNOSIS — Z97.4 DOES USE HEARING AID: ICD-10-CM

## 2024-08-22 DIAGNOSIS — Z92.3 H/O THERAPEUTIC RADIATION: ICD-10-CM

## 2024-08-22 DIAGNOSIS — R71.8 ELEVATED HEMATOCRIT: ICD-10-CM

## 2024-08-22 PROCEDURE — 3074F SYST BP LT 130 MM HG: CPT | Performed by: FAMILY MEDICINE

## 2024-08-22 PROCEDURE — 99214 OFFICE O/P EST MOD 30 MIN: CPT | Performed by: FAMILY MEDICINE

## 2024-08-22 PROCEDURE — 1123F ACP DISCUSS/DSCN MKR DOCD: CPT | Performed by: FAMILY MEDICINE

## 2024-08-22 PROCEDURE — 3078F DIAST BP <80 MM HG: CPT | Performed by: FAMILY MEDICINE

## 2024-08-22 SDOH — ECONOMIC STABILITY: FOOD INSECURITY: WITHIN THE PAST 12 MONTHS, YOU WORRIED THAT YOUR FOOD WOULD RUN OUT BEFORE YOU GOT MONEY TO BUY MORE.: NEVER TRUE

## 2024-08-22 SDOH — ECONOMIC STABILITY: INCOME INSECURITY: HOW HARD IS IT FOR YOU TO PAY FOR THE VERY BASICS LIKE FOOD, HOUSING, MEDICAL CARE, AND HEATING?: NOT HARD AT ALL

## 2024-08-22 SDOH — ECONOMIC STABILITY: FOOD INSECURITY: WITHIN THE PAST 12 MONTHS, THE FOOD YOU BOUGHT JUST DIDN'T LAST AND YOU DIDN'T HAVE MONEY TO GET MORE.: NEVER TRUE

## 2024-08-22 ASSESSMENT — PATIENT HEALTH QUESTIONNAIRE - PHQ9
2. FEELING DOWN, DEPRESSED OR HOPELESS: NOT AT ALL
SUM OF ALL RESPONSES TO PHQ9 QUESTIONS 1 & 2: 0
SUM OF ALL RESPONSES TO PHQ QUESTIONS 1-9: 0
1. LITTLE INTEREST OR PLEASURE IN DOING THINGS: NOT AT ALL
SUM OF ALL RESPONSES TO PHQ QUESTIONS 1-9: 0

## 2024-08-22 NOTE — PROGRESS NOTES
Cole Ellington (:  1940) is a 84 y.o. male, Established patient, here for evaluation of the following chief complaint(s):  Hypertension, Cholesterol Problem, Gastric reflux, Post void dribbling, and Coronary Artery Disease         Assessment & Plan  1. Elevated hemoglobin and hematocrit.  His hemoglobin and hematocrit levels are slightly elevated. He drinks alcohol but does not smoke. He is currently taking aspirin. Labs will be repeated before the next visit to monitor these levels.    2. Hypertension.  His blood pressure is stable. He will continue his current medication dosage and adhere to a low-salt diet. Follow-up with his cardiologist is recommended.    3. Congestive Heart Failure (CHF).  He has a history of CHF but is currently asymptomatic with no chest pain, shortness of breath, or leg swelling. He will continue his current medication and follow up with cardiology, adhering to their recommendations.    4. Prostate Cancer.  He has a history of prostate cancer treated with radiation in . Post-void dribbling has improved. He will continue to follow the advice of his urologist, Dr. Brito.    5. Nocturnal Urinary Frequency.  He experiences frequent urination at night, which could be contributing to sleep disturbances. This is being managed by Dr. Brito.    6. Hypercholesterolemia.  His cholesterol levels are stable. He will continue his cholesterol medication and adhere to a low-fat, low-carb diet. A handout on a low-fat diet was provided.    7. Hearing Impairment.  He has poor hearing and uses hearing aids occasionally. He was advised to use his hearing aids more consistently and will have a formal hearing test in a couple of weeks.    8. Health Maintenance.  He is due for several vaccines. A pneumonia vaccine and an influenza vaccine were recommended to be administered today. An RSV vaccine is planned for a few weeks later. He was given the option to receive a COVID-19 booster if he has not

## 2024-08-22 NOTE — PROGRESS NOTES
\"Have you been to the ER, urgent care clinic since your last visit?  Hospitalized since your last visit?\"    NO    “Have you seen or consulted any other health care providers outside of [facility] since your last visit?”    Urology, Dr. Chriss ESCUDERO: 6/28/24    Chief Complaint   Patient presents with    Hypertension    Cholesterol Problem    Gastric reflux    Post void dribbling    Coronary Artery Disease           Click Here for Release of Records Request

## 2024-08-22 NOTE — PATIENT INSTRUCTIONS
Probiotic over the  counter.   First take pneumonia and flu shot then few weeks after take RSV shot and then if you decide take covid booster.   Then last if you decide and cost Is ok with you take shingle shot

## 2024-10-04 ASSESSMENT — PATIENT HEALTH QUESTIONNAIRE - PHQ9
1. LITTLE INTEREST OR PLEASURE IN DOING THINGS: NOT AT ALL
SUM OF ALL RESPONSES TO PHQ9 QUESTIONS 1 & 2: 0
SUM OF ALL RESPONSES TO PHQ QUESTIONS 1-9: 0
2. FEELING DOWN, DEPRESSED OR HOPELESS: NOT AT ALL
SUM OF ALL RESPONSES TO PHQ QUESTIONS 1-9: 0

## 2024-10-04 NOTE — PROGRESS NOTES
Cole Ellington presents today for No chief complaint on file.      Cole Ellington preferred language for health care discussion is english/other.    Is someone accompanying this pt? no    Is the patient using any DME equipment during OV? no    Depression Screening:  Depression: Not at risk (8/22/2024)    PHQ-2     PHQ-2 Score: 0        Learning Assessment:  Who is the primary learner? Patient    What is the preferred language for health care of the primary learner? ENGLISH    How does the primary learner prefer to learn new concepts? DEMONSTRATION    Answered By patient    Relationship to Learner SELF           Pt currently taking Anticoagulant therapy? no    Pt currently taking Antiplatelet therapy ? aspirin      Coordination of Care:  1. Have you been to the ER, urgent care clinic since your last visit? Hospitalized since your last visit? no    2. Have you seen or consulted any other health care providers outside of the [facility] since your last visit? Include any pap smears or colon screening. no

## 2024-10-08 ENCOUNTER — OFFICE VISIT (OUTPATIENT)
Age: 84
End: 2024-10-08
Payer: COMMERCIAL

## 2024-10-08 VITALS
WEIGHT: 223 LBS | DIASTOLIC BLOOD PRESSURE: 70 MMHG | SYSTOLIC BLOOD PRESSURE: 122 MMHG | OXYGEN SATURATION: 95 % | HEIGHT: 74 IN | HEART RATE: 60 BPM | BODY MASS INDEX: 28.62 KG/M2

## 2024-10-08 DIAGNOSIS — R07.9 CHEST PAIN, UNSPECIFIED TYPE: ICD-10-CM

## 2024-10-08 DIAGNOSIS — I25.10 ATHEROSCLEROSIS OF NATIVE CORONARY ARTERY WITHOUT ANGINA PECTORIS, UNSPECIFIED WHETHER NATIVE OR TRANSPLANTED HEART: Primary | ICD-10-CM

## 2024-10-08 DIAGNOSIS — E78.00 PURE HYPERCHOLESTEROLEMIA, UNSPECIFIED: ICD-10-CM

## 2024-10-08 PROCEDURE — 3074F SYST BP LT 130 MM HG: CPT | Performed by: INTERNAL MEDICINE

## 2024-10-08 PROCEDURE — 93000 ELECTROCARDIOGRAM COMPLETE: CPT | Performed by: INTERNAL MEDICINE

## 2024-10-08 PROCEDURE — 1123F ACP DISCUSS/DSCN MKR DOCD: CPT | Performed by: INTERNAL MEDICINE

## 2024-10-08 PROCEDURE — 99214 OFFICE O/P EST MOD 30 MIN: CPT | Performed by: INTERNAL MEDICINE

## 2024-10-08 PROCEDURE — 3078F DIAST BP <80 MM HG: CPT | Performed by: INTERNAL MEDICINE

## 2024-10-08 NOTE — PROGRESS NOTES
HISTORY OF PRESENT ILLNESS  Cole Ellington  84 y.o. male     Chief Complaint   Patient presents with    Follow-up     6 to 9 months       ASSESSMENT and PLAN    The primary encounter diagnosis was Atherosclerosis of native coronary artery without angina pectoris, unspecified whether native or transplanted heart. Diagnoses of Chest pain, unspecified type and Pure hypercholesterolemia, unspecified were also pertinent to this visit.    Mr. Cole Ellington has history of CAD.  Back in January 2004, he had a myocardial infarction in [facility], when he presented with nausea and no chest pain.  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  His nuclear scan showed inferior wall scar.  His nuclear scan in December 2019 showed EF of 44% with fixed inferobasal and inferolateral defect.  No significant reversibility was noted.  He has history of dyslipidemia and hypertension.  He denies tobacco use.  He denies family history of CAD.  Unfortunately, he does not have Medicare part B which covers outpatient testing.  He has [employer] health insurance.  Because his cost would be over $100 for nuclear stress test, he would like to wait.  I advised him if he does develop symptoms, he needs to let me know.    Medication regimen reviewed and current cardiac regimen will be continued.  All new testing since the last office visit was independently reviewed by me.    CAD:    Symptomatically stable.  HTN:   Well-controlled at 122/70.  Continue current BP regimen.  Rhythm:    Regular sinus rhythm at 60 bpm.  CHF:    There is no evidence of decompensated CHF noted.  BMI:     His weight today is 223 pounds.  His baseline weight is 220 pounds.  Hyperlipidemia:   Target LDL <70.  Continue Zocor 80.  Anti-platelet:   Remains on ASA.     I will see him back in 6 months.    Thank you.    Orders Placed This Encounter   Procedures    EKG

## 2024-11-27 RX ORDER — SIMVASTATIN 80 MG
TABLET ORAL
Qty: 90 TABLET | Refills: 3 | Status: SHIPPED | OUTPATIENT
Start: 2024-11-27

## 2025-02-24 ENCOUNTER — HOSPITAL ENCOUNTER (OUTPATIENT)
Facility: HOSPITAL | Age: 85
Discharge: HOME | End: 2025-02-27
Payer: COMMERCIAL

## 2025-02-24 ENCOUNTER — OFFICE VISIT (OUTPATIENT)
Facility: CLINIC | Age: 85
End: 2025-02-24
Payer: COMMERCIAL

## 2025-02-24 VITALS
HEART RATE: 59 BPM | HEIGHT: 74 IN | WEIGHT: 215.6 LBS | RESPIRATION RATE: 16 BRPM | OXYGEN SATURATION: 99 % | SYSTOLIC BLOOD PRESSURE: 118 MMHG | BODY MASS INDEX: 27.67 KG/M2 | TEMPERATURE: 97.6 F | DIASTOLIC BLOOD PRESSURE: 60 MMHG

## 2025-02-24 DIAGNOSIS — D58.2 ELEVATED HEMOGLOBIN: Primary | ICD-10-CM

## 2025-02-24 DIAGNOSIS — D58.2 ELEVATED HEMOGLOBIN: ICD-10-CM

## 2025-02-24 DIAGNOSIS — R71.8 ELEVATED HEMATOCRIT: ICD-10-CM

## 2025-02-24 DIAGNOSIS — I25.10 ATHEROSCLEROSIS OF NATIVE CORONARY ARTERY OF NATIVE HEART WITHOUT ANGINA PECTORIS: ICD-10-CM

## 2025-02-24 DIAGNOSIS — I10 ESSENTIAL HYPERTENSION, BENIGN: ICD-10-CM

## 2025-02-24 DIAGNOSIS — R26.89 BALANCE PROBLEMS: ICD-10-CM

## 2025-02-24 DIAGNOSIS — E78.00 PURE HYPERCHOLESTEROLEMIA: ICD-10-CM

## 2025-02-24 LAB
BASOPHILS # BLD: 0.12 K/UL (ref 0–0.1)
BASOPHILS NFR BLD: 1.3 % (ref 0–2)
DIFFERENTIAL METHOD BLD: ABNORMAL
EOSINOPHIL # BLD: 0.27 K/UL (ref 0–0.4)
EOSINOPHIL NFR BLD: 3 % (ref 0–5)
ERYTHROCYTE [DISTWIDTH] IN BLOOD BY AUTOMATED COUNT: 13.3 % (ref 11.6–14.5)
HCT VFR BLD AUTO: 51.8 % (ref 36–48)
HGB BLD-MCNC: 16.7 G/DL (ref 13–16)
IMM GRANULOCYTES # BLD AUTO: 0.07 K/UL (ref 0–0.04)
IMM GRANULOCYTES NFR BLD AUTO: 0.8 % (ref 0–0.5)
LYMPHOCYTES # BLD: 2.34 K/UL (ref 0.9–3.6)
LYMPHOCYTES NFR BLD: 25.6 % (ref 21–52)
MCH RBC QN AUTO: 32 PG (ref 24–34)
MCHC RBC AUTO-ENTMCNC: 32.2 G/DL (ref 31–37)
MCV RBC AUTO: 99.2 FL (ref 78–100)
MONOCYTES # BLD: 1.06 K/UL (ref 0.05–1.2)
MONOCYTES NFR BLD: 11.6 % (ref 3–10)
NEUTS SEG # BLD: 5.28 K/UL (ref 1.8–8)
NEUTS SEG NFR BLD: 57.7 % (ref 40–73)
NRBC # BLD: 0 K/UL (ref 0–0.01)
NRBC BLD-RTO: 0 PER 100 WBC
PLATELET # BLD AUTO: 198 K/UL (ref 135–420)
PMV BLD AUTO: 11 FL (ref 9.2–11.8)
RBC # BLD AUTO: 5.22 M/UL (ref 4.35–5.65)
WBC # BLD AUTO: 9.1 K/UL (ref 4.6–13.2)

## 2025-02-24 PROCEDURE — 3074F SYST BP LT 130 MM HG: CPT | Performed by: FAMILY MEDICINE

## 2025-02-24 PROCEDURE — 85025 COMPLETE CBC W/AUTO DIFF WBC: CPT

## 2025-02-24 PROCEDURE — 1123F ACP DISCUSS/DSCN MKR DOCD: CPT | Performed by: FAMILY MEDICINE

## 2025-02-24 PROCEDURE — 3078F DIAST BP <80 MM HG: CPT | Performed by: FAMILY MEDICINE

## 2025-02-24 PROCEDURE — 99214 OFFICE O/P EST MOD 30 MIN: CPT | Performed by: FAMILY MEDICINE

## 2025-02-24 PROCEDURE — 36415 COLL VENOUS BLD VENIPUNCTURE: CPT

## 2025-02-24 RX ORDER — FLUTICASONE PROPIONATE 50 MCG
2 SPRAY, SUSPENSION (ML) NASAL DAILY PRN
Qty: 16 G | Refills: 0 | Status: SHIPPED | OUTPATIENT
Start: 2025-02-24

## 2025-02-24 RX ORDER — METOPROLOL SUCCINATE 25 MG/1
TABLET, EXTENDED RELEASE ORAL
Qty: 90 TABLET | Refills: 3 | Status: SHIPPED | OUTPATIENT
Start: 2025-02-24

## 2025-02-24 SDOH — ECONOMIC STABILITY: FOOD INSECURITY: WITHIN THE PAST 12 MONTHS, YOU WORRIED THAT YOUR FOOD WOULD RUN OUT BEFORE YOU GOT MONEY TO BUY MORE.: NEVER TRUE

## 2025-02-24 SDOH — ECONOMIC STABILITY: FOOD INSECURITY: WITHIN THE PAST 12 MONTHS, THE FOOD YOU BOUGHT JUST DIDN'T LAST AND YOU DIDN'T HAVE MONEY TO GET MORE.: NEVER TRUE

## 2025-02-24 ASSESSMENT — PATIENT HEALTH QUESTIONNAIRE - PHQ9
SUM OF ALL RESPONSES TO PHQ QUESTIONS 1-9: 0
SUM OF ALL RESPONSES TO PHQ QUESTIONS 1-9: 0
1. LITTLE INTEREST OR PLEASURE IN DOING THINGS: NOT AT ALL
2. FEELING DOWN, DEPRESSED OR HOPELESS: NOT AT ALL
SUM OF ALL RESPONSES TO PHQ QUESTIONS 1-9: 0
SUM OF ALL RESPONSES TO PHQ QUESTIONS 1-9: 0
SUM OF ALL RESPONSES TO PHQ9 QUESTIONS 1 & 2: 0

## 2025-02-24 NOTE — PATIENT INSTRUCTIONS
[facility] Transportation Resources*  (Call [facility]/211 if need more resources.)    [facility]  What they offer: [employer] [facility] [employer] offers fixed routes, medical transportation, and on-demand response transit for those age 60+.  Accepts donations for regular service.  Fare: Approximately $4.00 each way  Phone Number: [phone]  Website: https://www.[employer]    [facility] [employer]  What they offer: In compliance with the American Disabilities Act, [facility] [employer] provides a shared ride, advanced reservation, origin to destination service for disabled individuals who are unable to use regular fixed route public transportation services because of their disabilities.   Phone Number: [phone]  Apply online: https://www.[employer]/TransitAgencyChoice.aspx     Medicare Advantage Plans  Some plans may provide transportation. Members should contact the Member Services or Transportation number on the back of their insurance card for more information or to schedule transportation.    Medicaid Plans - [facility] Plus     Each health plan has options for transportation services. Contact your insurance provider to schedule transportation. Transportation or Member Services contact information is listed on the back of the insurance card.         Insurance Contacts     o [employer] [facility]   Phone: 1-608.761.6642   Website:  [email or web address].Progression/[place]    o [employer] [facility]   Phone: 1-946.548.1049  Website: https://www.[employer]/vamedicaid    o [employer]   Phone: (613) [phone]  Website: https://www.[employer].Progression/members    o [facility]   Phone: 1-319.803.5690 or 1-729.239.1666   Website: [email or web address]/[email or web address]    o [employer] Community [employer]   Phone: 1-234.346.3711  Website: [email or web address]://[email or web address].Progression/[place]

## 2025-02-24 NOTE — PROGRESS NOTES
Cole Ellington (:  1940) is a 84 y.o. male, Established patient, here for evaluation of the following chief complaint(s):  Cholesterol Problem, Hypertension, and Sleep Problem         Assessment & Plan  1. Benign Prostatic Hyperplasia (BPH).  His Prostate-Specific Antigen (PSA) levels, as evaluated by Dr. Brito, are within the normal range. He is currently under the care of a urologist for his BPH, which is being actively monitored.    2. Elevated Hemoglobin and Hematocrit.  His most recent blood work indicated elevated levels of hemoglobin and hematocrit. A repeat blood test will be conducted to monitor these levels.    3. Hypercholesterolemia.  His cholesterol levels are stable and within the normal range. He will continue his current cholesterol medication regimen.    4. Hypertension.  His blood pressure is well-controlled. He will continue his current antihypertensive medication regimen, including metoprolol.    5. History of Myocardial Infarction (MI).  He had an inferior wall MI and a single stent was placed in either the RCA or circumflex artery. He has inferior wall scarring by nuclear scan and an ejection fraction (EF) of 44% in 2019 with a fixed inferobasal and inferolateral defect. He currently has no anginal symptoms. He will continue risk factor modification and follow cardiology recommendations. He will continue taking aspirin 81 mg daily.    6. Health Maintenance.  He is advised to receive the influenza vaccine, COVID-19 booster, RSV vaccine, and pneumonia vaccine at his local pharmacy. He is encouraged to maintain a healthy lifestyle, including regular walking and exercise as tolerated, and adherence to a low-fat, low-salt diet.    Follow-up  The patient will follow up in 6 months for a physical examination.    Results  Laboratory Studies  PSA within normal limit. Hemoglobin and hematocrit were elevated. Cholesterol results were stable and within normal range.  1. Elevated hemoglobin  -

## 2025-02-24 NOTE — PROGRESS NOTES
\"Have you been to the ER, urgent care clinic since your last visit?  Hospitalized since your last visit?\"    [facility] ED LOV: 10/15/24    “Have you seen or consulted any other health care providers outside our system since your last visit?”    Urology LOV: 1/29/25    Chief Complaint   Patient presents with    Cholesterol Problem    Hypertension    Sleep Problem

## 2025-02-27 DIAGNOSIS — D75.1 POLYCYTHEMIA: Primary | ICD-10-CM

## 2025-03-20 ENCOUNTER — RESULTS FOLLOW-UP (OUTPATIENT)
Facility: HOSPITAL | Age: 85
End: 2025-03-20

## 2025-04-09 ENCOUNTER — OFFICE VISIT (OUTPATIENT)
Age: 85
End: 2025-04-09
Payer: COMMERCIAL

## 2025-04-09 VITALS
BODY MASS INDEX: 27.85 KG/M2 | DIASTOLIC BLOOD PRESSURE: 74 MMHG | WEIGHT: 217 LBS | SYSTOLIC BLOOD PRESSURE: 122 MMHG | HEIGHT: 74 IN | HEART RATE: 59 BPM | OXYGEN SATURATION: 98 %

## 2025-04-09 DIAGNOSIS — I25.10 ATHEROSCLEROSIS OF NATIVE CORONARY ARTERY WITHOUT ANGINA PECTORIS, UNSPECIFIED WHETHER NATIVE OR TRANSPLANTED HEART: Primary | ICD-10-CM

## 2025-04-09 DIAGNOSIS — R07.9 CHEST PAIN, UNSPECIFIED TYPE: ICD-10-CM

## 2025-04-09 DIAGNOSIS — I25.10 ATHEROSCLEROSIS OF NATIVE CORONARY ARTERY OF NATIVE HEART WITHOUT ANGINA PECTORIS: ICD-10-CM

## 2025-04-09 DIAGNOSIS — E78.00 PURE HYPERCHOLESTEROLEMIA, UNSPECIFIED: ICD-10-CM

## 2025-04-09 PROCEDURE — 3074F SYST BP LT 130 MM HG: CPT | Performed by: INTERNAL MEDICINE

## 2025-04-09 PROCEDURE — 1123F ACP DISCUSS/DSCN MKR DOCD: CPT | Performed by: INTERNAL MEDICINE

## 2025-04-09 PROCEDURE — 3078F DIAST BP <80 MM HG: CPT | Performed by: INTERNAL MEDICINE

## 2025-04-09 PROCEDURE — 93000 ELECTROCARDIOGRAM COMPLETE: CPT | Performed by: INTERNAL MEDICINE

## 2025-04-09 PROCEDURE — 99214 OFFICE O/P EST MOD 30 MIN: CPT | Performed by: INTERNAL MEDICINE

## 2025-04-09 NOTE — PROGRESS NOTES
Cole Ellington presents today for   Chief Complaint   Patient presents with    Follow-up     6 month f/u with no concerns        Cole Ellington preferred language for health care discussion is english/other.    Is someone accompanying this pt? no    Is the patient using any DME equipment during OV? yes    Depression Screening:  Depression: Not at risk (2/24/2025)    PHQ-2     PHQ-2 Score: 0        Learning Assessment:  No question data found.       Pt currently taking Anticoagulant therapy? no    Pt currently taking Antiplatelet therapy ? ASA 81 mg 1x daily       Coordination of Care:  1. Have you been to the ER, urgent care clinic since your last visit? Hospitalized since your last visit? no    2. Have you seen or consulted any other health care providers outside of the [facility] since your last visit? Include any pap smears or colon screening. no    
08/13/2024 08:45 AM    HDL 54 08/13/2024 08:45 AM    LDL 60.4 08/13/2024 08:45 AM    LDL 65.4 01/25/2023 09:04 AM        BP Readings from Last 3 Encounters:   04/09/25 122/74   02/24/25 118/60   10/08/24 122/70        Pulse Readings from Last 3 Encounters:   04/09/25 59   02/24/25 59   10/08/24 60       Wt Readings from Last 3 Encounters:   04/09/25 98.4 kg (217 lb)   02/24/25 97.8 kg (215 lb 9.6 oz)   01/29/25 97.5 kg (215 lb)         EKG: sinus bradycardia, Q waves in leads III and aVF with lateral T wave inversions noted in leads I, aVL, V5 and V6, unchanged from previous tracings.     Geovanny Pearson MD   4/9/2025

## 2025-05-19 ENCOUNTER — PATIENT MESSAGE (OUTPATIENT)
Age: 85
End: 2025-05-19

## 2025-05-20 ENCOUNTER — RESULTS FOLLOW-UP (OUTPATIENT)
Age: 85
End: 2025-05-20

## 2025-05-27 NOTE — TELEPHONE ENCOUNTER
----- Message from Dr. Geovanny Pearson MD sent at 5/23/2025 10:09 AM EDT -----  His perfusion imaging is without changes.  However, his overall LV function has declined minimally from 44% now down to 37%.  Would continue medical therapy and observation.  ----- Message -----  From: Jb Marte LPN  Sent: 5/20/2025   1:24 PM EDT  To: Geovanny Pearson MD    Per your last office note      It has been 6 years since his last nuclear scan and 21 years since his MI.  He has never had chest pains.  Therefore, I have recommended proceeding on with pharmacologic nuclear scan.

## 2025-08-25 ENCOUNTER — OFFICE VISIT (OUTPATIENT)
Facility: CLINIC | Age: 85
End: 2025-08-25
Payer: COMMERCIAL

## 2025-08-25 VITALS
SYSTOLIC BLOOD PRESSURE: 126 MMHG | HEART RATE: 66 BPM | TEMPERATURE: 97.1 F | OXYGEN SATURATION: 96 % | BODY MASS INDEX: 27.62 KG/M2 | DIASTOLIC BLOOD PRESSURE: 62 MMHG | RESPIRATION RATE: 16 BRPM | WEIGHT: 215.2 LBS | HEIGHT: 74 IN

## 2025-08-25 DIAGNOSIS — Z85.46 HISTORY OF PROSTATE CANCER: ICD-10-CM

## 2025-08-25 DIAGNOSIS — R14.0 BLOATING: ICD-10-CM

## 2025-08-25 DIAGNOSIS — I25.10 ATHEROSCLEROSIS OF NATIVE CORONARY ARTERY OF NATIVE HEART WITHOUT ANGINA PECTORIS: ICD-10-CM

## 2025-08-25 DIAGNOSIS — R71.8 ELEVATED HEMATOCRIT: ICD-10-CM

## 2025-08-25 DIAGNOSIS — D58.2 ELEVATED HEMOGLOBIN: ICD-10-CM

## 2025-08-25 DIAGNOSIS — Z92.3 H/O THERAPEUTIC RADIATION: ICD-10-CM

## 2025-08-25 DIAGNOSIS — Z00.00 ENCOUNTER FOR WELL ADULT EXAM WITHOUT ABNORMAL FINDINGS: Primary | ICD-10-CM

## 2025-08-25 DIAGNOSIS — I10 ESSENTIAL HYPERTENSION, BENIGN: ICD-10-CM

## 2025-08-25 DIAGNOSIS — I50.22 CHRONIC SYSTOLIC (CONGESTIVE) HEART FAILURE (HCC): ICD-10-CM

## 2025-08-25 DIAGNOSIS — R41.3 MEMORY CHANGES: ICD-10-CM

## 2025-08-25 DIAGNOSIS — Z80.42 FAMILY HISTORY OF PROSTATE CANCER: ICD-10-CM

## 2025-08-25 DIAGNOSIS — H53.9 VISION CHANGES: ICD-10-CM

## 2025-08-25 DIAGNOSIS — N40.0 ENLARGED PROSTATE: ICD-10-CM

## 2025-08-25 PROCEDURE — 99397 PER PM REEVAL EST PAT 65+ YR: CPT | Performed by: FAMILY MEDICINE

## 2025-08-25 PROCEDURE — 99214 OFFICE O/P EST MOD 30 MIN: CPT | Performed by: FAMILY MEDICINE

## 2025-08-25 PROCEDURE — 3078F DIAST BP <80 MM HG: CPT | Performed by: FAMILY MEDICINE

## 2025-08-25 PROCEDURE — 3074F SYST BP LT 130 MM HG: CPT | Performed by: FAMILY MEDICINE

## 2025-08-25 RX ORDER — FAMOTIDINE 20 MG/1
20 TABLET, FILM COATED ORAL 2 TIMES DAILY
Qty: 60 TABLET | Refills: 1 | Status: SHIPPED | OUTPATIENT
Start: 2025-08-25

## 2025-08-25 ASSESSMENT — LIFESTYLE VARIABLES
HOW MANY STANDARD DRINKS CONTAINING ALCOHOL DO YOU HAVE ON A TYPICAL DAY: 1 OR 2
HOW OFTEN DO YOU HAVE A DRINK CONTAINING ALCOHOL: 4 OR MORE TIMES A WEEK